# Patient Record
Sex: MALE | Race: WHITE | NOT HISPANIC OR LATINO
[De-identification: names, ages, dates, MRNs, and addresses within clinical notes are randomized per-mention and may not be internally consistent; named-entity substitution may affect disease eponyms.]

---

## 2018-07-03 ENCOUNTER — TRANSCRIPTION ENCOUNTER (OUTPATIENT)
Age: 83
End: 2018-07-03

## 2019-03-20 ENCOUNTER — NON-APPOINTMENT (OUTPATIENT)
Age: 84
End: 2019-03-20

## 2019-03-20 ENCOUNTER — APPOINTMENT (OUTPATIENT)
Dept: CARDIOLOGY | Facility: CLINIC | Age: 84
End: 2019-03-20
Payer: MEDICARE

## 2019-03-20 VITALS
WEIGHT: 198 LBS | HEIGHT: 70 IN | BODY MASS INDEX: 28.35 KG/M2 | OXYGEN SATURATION: 99 % | SYSTOLIC BLOOD PRESSURE: 181 MMHG | DIASTOLIC BLOOD PRESSURE: 98 MMHG | HEART RATE: 88 BPM

## 2019-03-20 VITALS — DIASTOLIC BLOOD PRESSURE: 88 MMHG | SYSTOLIC BLOOD PRESSURE: 140 MMHG

## 2019-03-20 DIAGNOSIS — R01.1 CARDIAC MURMUR, UNSPECIFIED: ICD-10-CM

## 2019-03-20 DIAGNOSIS — Z87.81 PERSONAL HISTORY OF (HEALED) TRAUMATIC FRACTURE: ICD-10-CM

## 2019-03-20 DIAGNOSIS — L71.9 ROSACEA, UNSPECIFIED: ICD-10-CM

## 2019-03-20 DIAGNOSIS — Z87.891 PERSONAL HISTORY OF NICOTINE DEPENDENCE: ICD-10-CM

## 2019-03-20 PROCEDURE — 99204 OFFICE O/P NEW MOD 45 MIN: CPT

## 2019-03-20 PROCEDURE — 93000 ELECTROCARDIOGRAM COMPLETE: CPT

## 2019-04-10 ENCOUNTER — APPOINTMENT (OUTPATIENT)
Dept: CARDIOLOGY | Facility: CLINIC | Age: 84
End: 2019-04-10
Payer: MEDICARE

## 2019-04-10 PROCEDURE — 93306 TTE W/DOPPLER COMPLETE: CPT

## 2019-04-12 NOTE — REASON FOR VISIT
[Initial Evaluation] : an initial evaluation of [Hypertension] : hypertension [FreeTextEntry1] : Mr. Petr Kaplan presented to the office today for initial cardiology consultation, regarding evaluation and management of hypertension. He has been following with another cardiologist, Dr. Petr Juan, however, he wanted to see me for an opinion, noting that I have been caring for his wife.\par \par The patient is an 83-year-old male who does not have a documented cardiac history. He does report having had an echocardiogram performed in the office it is primary care provider a few years ago, which revealed "some sort of abnormality". He decided to consult with Dr. Josue Merritt at that time, who referred him for a nuclear stress study for further evaluation. According to the patient, this study was "normal", and no further cardiac evaluation and/or treatment was recommended to him at that time. Approximately 2 years ago, the patient was noted to have elevated blood pressure reading at the time of a routine follow-up medical evaluation, for which his primary care provider recommended that antihypertensive therapy be initiated. The patient again decided to consult with Dr. Merritt, who he spoke with on the telephone, and who referred him to Dr. Petr Juan for further evaluation. Dr. Juan referred the patient for 24-hour ambulatory blood pressure monitoring for further evaluation, which was performed on 11/2/16, and revealed the average blood pressure reading to be 143/71 (average during waking hours 145/72, average during sleeping hours 135/65). Based on these findings, the patient states that Dr. Juan did not recommend antihypertensive therapy. The patient now presents for another opinion regarding management of his blood pressure.\par \par The patient does not describe having experienced chest discomfort or dyspnea on exertion in association with his activities. He has not noted orthopnea, paroxysmal nocturnal dyspnea, or lower extremity edema. He has not experienced any episodes of palpitations, presyncope or syncope.\par \par As far as risk factors for coronary artery disease are concerned, the patient has a history of hypertension, which has not been treated to date. He denies having a history of diabetes are dyslipidemia. He discontinued cigarette smoking at age 24, having smoked up to one pack per day for approximately 6 years prior to that time. He is not having no immediate family history of premature coronary artery disease.\par \par Past medical/surgical history according to the patient is otherwise significant for GERD, rosacea, varicose veins, a left shoulder fracture (surgical repair not required), and a laparoscopic right inguinal hernia repair (with mesh).

## 2019-04-12 NOTE — DISCUSSION/SUMMARY
[FreeTextEntry1] : Mr. Kaplan appears to have essential hypertension with a labile component, which has not been treated to date. 24 hour ambulatory blood pressure monitoring performed on 11/2/16 revealed the average blood pressure reading to be 143/71. He reports often exhibiting significantly higher blood pressure readings at visits to the physician, and more recently, upon presentation for dental implantation (the procedure was canceled in view of his elevated blood pressure reading). The patient does not describe having experienced any signs or symptoms to suggest the presence of an anginal syndrome, congestive heart failure, or a hemodynamically-compromising arrhythmia. His cardiac examination today is remarkable for a systolic ejection murmur, in all likelihood representing age-related valvular calcification. He is exhibiting bilateral lower extremity varicosities associated with mild ankle swelling bilaterally. His blood pressure reading was markedly elevated upon initial presentation to the office today, however, a follow-up measurement obtained after his examination revealed the reading to be only mildly elevated. His electrocardiogram today reveals sinus rhythm with non-specific and early R-wave transition in lead V2, non-specific diminished voltage in leads V5-V6, and a non-specific repolarization changes.\par \par I have explained to the patient that although he appears to have a labile component to his hypertension, the 24-hour ambulatory blood pressure monitoring study performed in 11/2/16 revealed his average systolic blood pressure to be in the mildly elevated range, and that antihypertensive therapy should, therefore, be considered.\par \par The patient does not wish to begin antihypertensive therapy at this point. Rather, he would like to attempt to modify his diet and lose weight, and his blood pressure remains elevated despite these measures, he would then consider antihypertensive therapy.\par \par I have reviewed a sodium-restricted diet at length with the patient today, and I advised him to adhere to this type of diet, in an effort to lower his blood pressure.\par \par I have explained to the patient that the abnormalities noted on his electrocardiogram are considered non-specific in nature. Reassuring is that he reports having had a negative nuclear stress study performed a few years ago.\par \par I am referring the patient for echocardiography to evaluate for the presence of hypertensive heart disease, as well as to evaluate the heart murmur heard on examination today. The patient is going to make arrangements to have this study performed through our office, and I will telephone him to discuss the findings, once the study has been completed.\par \par I have asked the patient to call me if he should have any questions or problems pertaining to these matters, and especially if he should experience any concerning symptoms. He will otherwise return to the office for follow-up cardiac evaluation and blood pressure reassessment in 3 months, or diet he remains clinically stable in the interim, and the findings on the upcoming echocardiogram and do not warrant sooner evaluation and/or treatment.

## 2019-04-12 NOTE — PHYSICAL EXAM
[General Appearance - In No Acute Distress] : no acute distress [Normal Conjunctiva] : the conjunctiva exhibited no abnormalities [No Oral Pallor] : no oral pallor [Not Palpable] : not palpable [No Precordial Heave] : no precordial heave was noted [Rhythm Regular] : regular [Normal Rate] : normal [Normal S1] : normal S1 [Normal S2] : normal S2 [No Gallop] : no gallop heard [II] : a grade 2 [2+] : right 2+ [No Abnormalities] : the abdominal aorta was not enlarged and no bruit was heard [___ +] : bilateral [unfilled]U+ pitting edema to the ankles [Rt] : varicose veins of the right leg noted [Lt] : varicose veins of the left leg noted [Respiration, Rhythm And Depth] : normal respiratory rhythm and effort [Auscultation Breath Sounds / Voice Sounds] : lungs were clear to auscultation bilaterally [Abdomen Tenderness] : non-tender [Bowel Sounds] : normal bowel sounds [Abnormal Walk] : normal gait [] : no rash [Cyanosis, Localized] : no localized cyanosis [Oriented To Time, Place, And Person] : oriented to person, place, and time [FreeTextEntry1] : no JVD is appreciated at a 45° angle [Apical Thrill] : no thrill palpable at the apex [Click] : no click [Pericardial Rub] : no pericardial rub [Distant] : the heart sounds were ~L not distant [Left Carotid Bruit] : no bruit heard over the left carotid [Right Carotid Bruit] : no bruit heard over the right carotid

## 2019-06-24 ENCOUNTER — APPOINTMENT (OUTPATIENT)
Dept: CARDIOLOGY | Facility: CLINIC | Age: 84
End: 2019-06-24
Payer: MEDICARE

## 2019-06-24 ENCOUNTER — NON-APPOINTMENT (OUTPATIENT)
Age: 84
End: 2019-06-24

## 2019-06-24 VITALS
DIASTOLIC BLOOD PRESSURE: 79 MMHG | WEIGHT: 192 LBS | HEART RATE: 66 BPM | SYSTOLIC BLOOD PRESSURE: 169 MMHG | BODY MASS INDEX: 27.49 KG/M2 | OXYGEN SATURATION: 97 % | HEIGHT: 70 IN

## 2019-06-24 PROCEDURE — 93000 ELECTROCARDIOGRAM COMPLETE: CPT

## 2019-06-24 PROCEDURE — 99215 OFFICE O/P EST HI 40 MIN: CPT

## 2019-06-24 NOTE — HISTORY OF PRESENT ILLNESS
[FreeTextEntry1] : Mr. Petr Kaplan presented to the office today for follow-up cardiac evaluation. I previously evaluated the patient in initial cardiology consultation on 3/20/19, regarding evaluation and management of hypertension. He had been following with another cardiologist, Dr. Petr Juan, however, he wanted to see me for an opinion, noting that I have been caring for his wife.\par \par The patient is an 84-year-old male who does not have a documented cardiac history. He does report having had an echocardiogram performed in the office of his primary care provider a few years ago, which revealed "some sort of abnormality". He decided to consult with Dr. Josue Merritt at that time, who referred him for a nuclear stress study for further evaluation. According to the patient, this study was "normal", and no further cardiac evaluation and/or treatment was recommended to him at that time. Approximately 2 years ago, the patient was noted to have elevated blood pressure reading at the time of a routine follow-up medical evaluation, for which his primary care provider recommended that antihypertensive therapy be initiated. The patient again decided to consult with Dr. Merritt, who he spoke with on the telephone, and who referred him to Dr. Petr Juan for further evaluation. Dr. Juan referred the patient for 24-hour ambulatory blood pressure monitoring for further evaluation, which was performed on 11/2/16, and revealed the average blood pressure reading to be 143/71 (average during waking hours 145/72, average during sleeping hours 135/65). Based on these findings, the patient states that Dr. Juan did not recommend antihypertensive therapy.\par \par At the time the patient's previous visit here on 3/20/19, he was exhibiting an elevated blood pressure reading. I recommended antihypertensive therapy, noting a previous ambulatory blood pressure monitoring performed with his previous cardiologist on 11/2/16 had revealed the average blood pressure reading to be elevated at 143/71. He declined being started on antihypertensive therapy at that time, however, stating that he wanted to try modifying his diet and losing weight. He presents today for blood pressure reassessment.\par \par The patient has been stable from a cardiac symptomatic standpoint since his previous visit here on 3/20/19. Specifically, he does not describe having experienced chest discomfort or dyspnea on exertion in association with his activities. He has not noted orthopnea, paroxysmal nocturnal dyspnea, or lower extremity edema. He has not experienced any episodes of palpitations, presyncope or syncope.\par \par Echocardiography performed on 4/10/19 revealed normal cardiac chamber size is with mild concentric left ventricular hypertrophy. Left ventricular systolic function was normal, with a calculated ejection fraction of 50%. Mild mitral regurgitation, mild pulmonic regurgitation, and mild to moderate tricuspid regurgitation were demonstrated, with an estimated right ventricular systolic pressure of 39 mm of mercury.\par \par As far as risk factors for coronary artery disease are concerned, the patient has a history of hypertension, which has not been treated to date. He denies having a history of diabetes are dyslipidemia. He discontinued cigarette smoking at age 24, having smoked up to one pack per day for approximately 6 years prior to that time. He is not having no immediate family history of premature coronary artery disease.\par \par Past medical/surgical history according to the patient is otherwise significant for GERD, rosacea, varicose veins, a left shoulder fracture (surgical repair not required), and a laparoscopic right inguinal hernia repair (with mesh).

## 2019-06-24 NOTE — DISCUSSION/SUMMARY
[FreeTextEntry1] : Mr. Kaplan appears to have essential hypertension with a labile component. 24 hour ambulatory blood pressure monitoring performed on 11/2/16 revealed the average blood pressure reading to be 143/71. He reports often exhibiting significantly higher blood pressure readings at visits to the physician, and more recently, upon presentation for dental implantation (the procedure was canceled in view of his elevated blood pressure reading). The patient does not describe having experienced any signs or symptoms to suggest the presence of an anginal syndrome, congestive heart failure, or a hemodynamically-compromising arrhythmia. His cardiac examination today is remarkable for a systolic ejection murmur, in all likelihood representing age-related valvular calcification, unchanged from his previous visit with me. He is again exhibiting bilateral lower extremity varicosities associated with mild ankle swelling bilaterally. His blood pressure reading is moderately elevated today. His electrocardiogram today reveals sinus rhythm with non-specific early R-wave transition in lead V2, non-specific diminished voltage in leads V5-V6, essentially unchanged from his previous office tracing.\par \par I have explained to the patient that although he appears to have a labile component to his hypertension, the 24-hour ambulatory blood pressure monitoring study performed in 11/2/16 revealed his average systolic blood pressure to be in the mildly elevated range, and that antihypertensive therapy should, therefore, be initiated. He is agreeable, and I have electronically prescribed Diovan 80 mg daily to his pharmacy for him today. I have asked him to call me if he should have any difficulty tolerating this medication.\par \par I have again reviewed a sodium-restricted diet at length with the patient today, and I have advised him to adhere to this type of diet, in an effort to lower his blood pressure.\par \par I have explained to the patient that the abnormalities noted on his electrocardiogram are considered non-specific in nature. Reassuring is that he reports having had a negative nuclear stress study performed a few years ago.\par \par I have reviewed the findings of the echocardiogram 4/10/19 in detail with the patient today, pointing out to him that the finding of mild concentric left ventricular hypertrophy is often associated with hypertension.\par \par I have asked the patient to call me if he should have any questions or problems pertaining to these matters, and especially if he should experience any concerning symptoms. I have otherwise asked him to return to the office for follow-up cardiac evaluation and blood pressure reassessment in one month, provided he remains clinically stable in the interim.

## 2019-06-24 NOTE — PHYSICAL EXAM
[General Appearance - In No Acute Distress] : no acute distress [Normal Conjunctiva] : the conjunctiva exhibited no abnormalities [No Oral Pallor] : no oral pallor [Respiration, Rhythm And Depth] : normal respiratory rhythm and effort [Bowel Sounds] : normal bowel sounds [Auscultation Breath Sounds / Voice Sounds] : lungs were clear to auscultation bilaterally [Abdomen Tenderness] : non-tender [Cyanosis, Localized] : no localized cyanosis [Abnormal Walk] : normal gait [] : no rash [Oriented To Time, Place, And Person] : oriented to person, place, and time [Not Palpable] : not palpable [No Precordial Heave] : no precordial heave was noted [Normal Rate] : normal [Rhythm Regular] : regular [Normal S1] : normal S1 [No Gallop] : no gallop heard [Normal S2] : normal S2 [II] : a grade 2 [2+] : left 2+ [No Abnormalities] : the abdominal aorta was not enlarged and no bruit was heard [___ +] : bilateral [unfilled]U+ pitting edema to the ankles [Rt] : varicose veins of the right leg noted [Lt] : varicose veins of the left leg noted [FreeTextEntry1] : no JVD is appreciated at a 45° angle [Apical Thrill] : no thrill palpable at the apex [Click] : no click [Pericardial Rub] : no pericardial rub [Right Carotid Bruit] : no bruit heard over the right carotid [Left Carotid Bruit] : no bruit heard over the left carotid

## 2019-07-24 ENCOUNTER — NON-APPOINTMENT (OUTPATIENT)
Age: 84
End: 2019-07-24

## 2019-07-24 ENCOUNTER — APPOINTMENT (OUTPATIENT)
Dept: CARDIOLOGY | Facility: CLINIC | Age: 84
End: 2019-07-24
Payer: MEDICARE

## 2019-07-24 VITALS — SYSTOLIC BLOOD PRESSURE: 150 MMHG | DIASTOLIC BLOOD PRESSURE: 80 MMHG

## 2019-07-24 VITALS
OXYGEN SATURATION: 99 % | SYSTOLIC BLOOD PRESSURE: 164 MMHG | HEIGHT: 70 IN | WEIGHT: 193 LBS | HEART RATE: 67 BPM | BODY MASS INDEX: 27.63 KG/M2 | DIASTOLIC BLOOD PRESSURE: 82 MMHG

## 2019-07-24 DIAGNOSIS — R03.0 ELEVATED BLOOD-PRESSURE READING, W/OUT DIAGNOSIS OF HYPERTENSION: ICD-10-CM

## 2019-07-24 PROCEDURE — 93000 ELECTROCARDIOGRAM COMPLETE: CPT

## 2019-07-24 PROCEDURE — 99215 OFFICE O/P EST HI 40 MIN: CPT

## 2019-08-01 ENCOUNTER — APPOINTMENT (OUTPATIENT)
Dept: CARDIOLOGY | Facility: CLINIC | Age: 84
End: 2019-08-01
Payer: MEDICARE

## 2019-08-01 PROCEDURE — 93790 AMBL BP MNTR W/SW I&R: CPT

## 2019-08-26 NOTE — PHYSICAL EXAM
[General Appearance - In No Acute Distress] : no acute distress [Normal Conjunctiva] : the conjunctiva exhibited no abnormalities [No Oral Pallor] : no oral pallor [Auscultation Breath Sounds / Voice Sounds] : lungs were clear to auscultation bilaterally [Respiration, Rhythm And Depth] : normal respiratory rhythm and effort [Abnormal Walk] : normal gait [Abdomen Tenderness] : non-tender [Bowel Sounds] : normal bowel sounds [] : no rash [Cyanosis, Localized] : no localized cyanosis [Oriented To Time, Place, And Person] : oriented to person, place, and time [Not Palpable] : not palpable [No Precordial Heave] : no precordial heave was noted [Normal Rate] : normal [Rhythm Regular] : regular [Normal S1] : normal S1 [Normal S2] : normal S2 [No Gallop] : no gallop heard [II] : a grade 2 [No Abnormalities] : the abdominal aorta was not enlarged and no bruit was heard [2+] : left 2+ [No Pitting Edema] : no pitting edema present [Rt] : varicose veins of the right leg noted [Lt] : varicose veins of the left leg noted [FreeTextEntry1] : no JVD is appreciated at a 45° angle [Apical Thrill] : no thrill palpable at the apex [Click] : no click [Pericardial Rub] : no pericardial rub [Left Carotid Bruit] : no bruit heard over the left carotid [Right Carotid Bruit] : no bruit heard over the right carotid

## 2019-08-26 NOTE — HISTORY OF PRESENT ILLNESS
[FreeTextEntry1] : Mr. Petr Kaplan presented to the office today for follow-up cardiac evaluation. I evaluated the patient in initial cardiology consultation on 3/20/19, regarding evaluation and management of hypertension. He had been following with another cardiologist, Dr. Petr Juan, however, he wanted to see me for an opinion, noting that I have been caring for his wife. I last evaluated the patient in the office on 6/24/19.\par \par The patient is an 84-year-old male who does not have a documented cardiac history. He does report having had an echocardiogram performed in the office of his primary care provider a few years ago, which revealed "some sort of abnormality". He decided to consult with Dr. Josue Merritt at that time, who referred him for a nuclear stress study for further evaluation. According to the patient, this study was "normal", and no further cardiac evaluation and/or treatment was recommended to him at that time. Approximately 2 years ago, the patient was noted to have elevated blood pressure reading at the time of a routine follow-up medical evaluation, for which his primary care provider recommended that antihypertensive therapy be initiated. The patient again decided to consult with Dr. Merritt, who he spoke with on the telephone, and who referred him to Dr. Petr Juan for further evaluation. Dr. Juan referred the patient for 24-hour ambulatory blood pressure monitoring for further evaluation, which was performed on 11/2/16, and revealed the average blood pressure reading to be 143/71 (average during waking hours 145/72, average during sleeping hours 135/65). Based on these findings, the patient states that Dr. Juan did not recommend antihypertensive therapy.\par \par At the time of a previous visit here on 3/20/19, the patient was exhibiting an elevated blood pressure reading. I recommended antihypertensive therapy, noting that previous ambulatory blood pressure monitoring performed with his previous cardiologist on 11/2/16 had revealed the average blood pressure reading to be elevated at 143/71. He declined being started on antihypertensive therapy at that time, however, stating that he wanted to try modifying his diet and losing weight. At the time of a follow-up visit on 6/24/19, he was again exhibiting an elevated blood pressure reading, at which point he was agreeable to being started on antihypertensive therapy. Diovan 80 mg daily was initiated at that time. He presents today for blood pressure reassessment.\par \par The patient has been stable from a cardiac symptomatic standpoint since his previous visit here on 6/24/19. Specifically, he does not describe having experienced chest discomfort or dyspnea on exertion in association with his activities. He has not noted orthopnea or paroxysmal nocturnal dyspnea. He has noted resolution of previously reported lower extremity swelling. He has not experienced any episodes of palpitations, presyncope or syncope.\par \par Echocardiography performed on 4/10/19 revealed normal cardiac chamber sizes with mild concentric left ventricular hypertrophy. Left ventricular systolic function was normal, with a calculated ejection fraction of 50%. Mild mitral regurgitation, mild pulmonic regurgitation, and mild to moderate tricuspid regurgitation were demonstrated, with an estimated right ventricular systolic pressure of 39 mm of mercury.\par \par As far as risk factors for coronary artery disease are concerned, the patient has a history of hypertension. He denies having a history of diabetes or a dyslipidemia. He discontinued cigarette smoking at age 24, having smoked up to one pack per day for approximately 6 years prior to that time. He is not having no immediate family history of premature coronary artery disease.\par \par Past medical/surgical history according to the patient is otherwise significant for GERD, rosacea, varicose veins, a left shoulder fracture (surgical repair not required), and a laparoscopic right inguinal hernia repair (with mesh).

## 2019-08-26 NOTE — DISCUSSION/SUMMARY
[FreeTextEntry1] : Mr. Kaplan appears to have essential hypertension with a labile component. 24 hour ambulatory blood pressure monitoring performed on 11/2/16 revealed the average blood pressure reading to be 143/71. He reports often exhibiting significantly higher blood pressure readings at visits to the physician, and more recently, upon presentation for dental implantation (the procedure was canceled in view of his elevated blood pressure reading). The patient does not describe having experienced any signs or symptoms to suggest the presence of an anginal syndrome, congestive heart failure, or a hemodynamically-compromising arrhythmia. His cardiac examination today is remarkable for a systolic ejection murmur, in all likelihood representing age-related valvular calcification, unchanged from his previous visit with me. He is again exhibiting bilateral lower extremity varicosities, however, he is not exhibiting significant lower extremity swelling today. His blood pressure reading is moderately elevated today. His electrocardiogram today reveals sinus rhythm with non-specific early R-wave transition in leads V1-V2 and non-specific diminished voltage in lead V6, essentially unchanged from his previous office tracing.\par \par Although the patient is exhibiting a moderately elevated blood pressure reading today, he states that his blood pressure reading at the time of a consultation with a pulmonologist revealed a systolic reading to be 130. Therefore, I have instructed him to continue Diovan a dosage of 80 mg daily for the time being, and I am referring him for 24-hour ambulatory blood pressure monitoring to assess blood pressure control. He is going to make arrangements to have this study performed through our office, and I will talk with him to discuss the findings, once the study has been completed. If this study should reveal that the blood pressure is not optimally controlled, the dosage of Diovan will be increased at that point.\par \par I have again reviewed a sodium-restricted diet at length with the patient today, and I have advised him to adhere to this type of diet, in an effort to lower his blood pressure.\par \par I have explained to the patient that the abnormalities noted on his electrocardiogram are considered non-specific in nature. Reassuring is that he reports having had a negative nuclear stress study performed a few years ago.\par \par I have reviewed the findings of the echocardiogram of 4/10/19 in detail with the patient today, pointing out to him that the finding of mild concentric left ventricular hypertrophy is often associated with hypertension.\par \par I have asked the patient to call me if he should have any questions or problems pertaining to these matters, and especially if he should experience any concerning symptoms. Further recommendations regarding cardiac evaluation and/or treatment will be considered, pending the patient's clinical course, as well as the findings on the upcoming ambulatory blood pressure monitoring study.

## 2019-09-14 LAB
ALBUMIN SERPL ELPH-MCNC: 4.3 G/DL
ALP BLD-CCNC: 55 U/L
ALT SERPL-CCNC: 14 U/L
ANION GAP SERPL CALC-SCNC: 12 MMOL/L
AST SERPL-CCNC: 15 U/L
BILIRUB SERPL-MCNC: 0.8 MG/DL
BUN SERPL-MCNC: 18 MG/DL
CALCIUM SERPL-MCNC: 9.4 MG/DL
CHLORIDE SERPL-SCNC: 104 MMOL/L
CHOLEST SERPL-MCNC: 161 MG/DL
CHOLEST/HDLC SERPL: 3.8 RATIO
CO2 SERPL-SCNC: 26 MMOL/L
CREAT SERPL-MCNC: 0.99 MG/DL
GLUCOSE SERPL-MCNC: 87 MG/DL
HDLC SERPL-MCNC: 42 MG/DL
LDLC SERPL CALC-MCNC: 101 MG/DL
POTASSIUM SERPL-SCNC: 4.2 MMOL/L
PROT SERPL-MCNC: 7 G/DL
SODIUM SERPL-SCNC: 142 MMOL/L
TRIGL SERPL-MCNC: 92 MG/DL

## 2019-09-25 ENCOUNTER — APPOINTMENT (OUTPATIENT)
Dept: CARDIOLOGY | Facility: CLINIC | Age: 84
End: 2019-09-25

## 2019-10-14 ENCOUNTER — RX RENEWAL (OUTPATIENT)
Age: 84
End: 2019-10-14

## 2020-02-05 ENCOUNTER — NON-APPOINTMENT (OUTPATIENT)
Age: 85
End: 2020-02-05

## 2020-02-05 ENCOUNTER — APPOINTMENT (OUTPATIENT)
Dept: CARDIOLOGY | Facility: CLINIC | Age: 85
End: 2020-02-05
Payer: MEDICARE

## 2020-02-05 VITALS — SYSTOLIC BLOOD PRESSURE: 138 MMHG | DIASTOLIC BLOOD PRESSURE: 78 MMHG

## 2020-02-05 VITALS
SYSTOLIC BLOOD PRESSURE: 167 MMHG | HEIGHT: 70 IN | HEART RATE: 58 BPM | WEIGHT: 204 LBS | OXYGEN SATURATION: 97 % | BODY MASS INDEX: 29.2 KG/M2 | DIASTOLIC BLOOD PRESSURE: 78 MMHG

## 2020-02-05 DIAGNOSIS — I07.1 RHEUMATIC TRICUSPID INSUFFICIENCY: ICD-10-CM

## 2020-02-05 DIAGNOSIS — M79.89 OTHER SPECIFIED SOFT TISSUE DISORDERS: ICD-10-CM

## 2020-02-05 DIAGNOSIS — R94.31 ABNORMAL ELECTROCARDIOGRAM [ECG] [EKG]: ICD-10-CM

## 2020-02-05 PROCEDURE — 93000 ELECTROCARDIOGRAM COMPLETE: CPT

## 2020-02-05 PROCEDURE — 99215 OFFICE O/P EST HI 40 MIN: CPT

## 2020-08-17 ENCOUNTER — NON-APPOINTMENT (OUTPATIENT)
Age: 85
End: 2020-08-17

## 2020-08-17 ENCOUNTER — APPOINTMENT (OUTPATIENT)
Dept: CARDIOLOGY | Facility: CLINIC | Age: 85
End: 2020-08-17
Payer: MEDICARE

## 2020-08-17 VITALS
SYSTOLIC BLOOD PRESSURE: 167 MMHG | HEIGHT: 70 IN | HEART RATE: 67 BPM | WEIGHT: 195 LBS | BODY MASS INDEX: 27.92 KG/M2 | DIASTOLIC BLOOD PRESSURE: 91 MMHG | OXYGEN SATURATION: 96 %

## 2020-08-17 VITALS — DIASTOLIC BLOOD PRESSURE: 70 MMHG | SYSTOLIC BLOOD PRESSURE: 128 MMHG

## 2020-08-17 DIAGNOSIS — I34.0 NONRHEUMATIC MITRAL (VALVE) INSUFFICIENCY: ICD-10-CM

## 2020-08-17 DIAGNOSIS — I10 ESSENTIAL (PRIMARY) HYPERTENSION: ICD-10-CM

## 2020-08-17 PROCEDURE — 99215 OFFICE O/P EST HI 40 MIN: CPT

## 2020-08-17 PROCEDURE — 93000 ELECTROCARDIOGRAM COMPLETE: CPT

## 2020-08-24 ENCOUNTER — RX RENEWAL (OUTPATIENT)
Age: 85
End: 2020-08-24

## 2020-10-27 ENCOUNTER — APPOINTMENT (OUTPATIENT)
Dept: CARDIOLOGY | Facility: CLINIC | Age: 85
End: 2020-10-27
Payer: MEDICARE

## 2020-10-27 PROCEDURE — 93306 TTE W/DOPPLER COMPLETE: CPT

## 2021-10-04 ENCOUNTER — RX RENEWAL (OUTPATIENT)
Age: 86
End: 2021-10-04

## 2021-10-06 PROBLEM — I10 ESSENTIAL HYPERTENSION: Status: ACTIVE | Noted: 2019-03-20

## 2022-04-05 ENCOUNTER — TRANSCRIPTION ENCOUNTER (OUTPATIENT)
Age: 87
End: 2022-04-05

## 2022-10-03 ENCOUNTER — RX RENEWAL (OUTPATIENT)
Age: 87
End: 2022-10-03

## 2022-11-22 ENCOUNTER — NON-APPOINTMENT (OUTPATIENT)
Age: 87
End: 2022-11-22

## 2022-12-27 ENCOUNTER — EMERGENCY (EMERGENCY)
Facility: HOSPITAL | Age: 87
LOS: 1 days | Discharge: HOME | End: 2022-12-27
Attending: EMERGENCY MEDICINE | Admitting: EMERGENCY MEDICINE
Payer: MEDICARE

## 2022-12-27 VITALS
OXYGEN SATURATION: 99 % | WEIGHT: 169.98 LBS | HEIGHT: 68 IN | TEMPERATURE: 98 F | DIASTOLIC BLOOD PRESSURE: 81 MMHG | SYSTOLIC BLOOD PRESSURE: 133 MMHG | HEART RATE: 96 BPM | RESPIRATION RATE: 18 BRPM

## 2022-12-27 LAB
ALBUMIN SERPL ELPH-MCNC: 2.4 G/DL — LOW (ref 3.3–5)
ALP SERPL-CCNC: 165 U/L — HIGH (ref 40–120)
ALT FLD-CCNC: 28 U/L — SIGNIFICANT CHANGE UP (ref 12–78)
ANION GAP SERPL CALC-SCNC: 11 MMOL/L — SIGNIFICANT CHANGE UP (ref 5–17)
APTT BLD: 28.8 SEC — SIGNIFICANT CHANGE UP (ref 27.5–35.5)
AST SERPL-CCNC: 41 U/L — HIGH (ref 15–37)
BASOPHILS # BLD AUTO: 0.03 K/UL — SIGNIFICANT CHANGE UP (ref 0–0.2)
BASOPHILS NFR BLD AUTO: 0.3 % — SIGNIFICANT CHANGE UP (ref 0–2)
BILIRUB SERPL-MCNC: 1.2 MG/DL — SIGNIFICANT CHANGE UP (ref 0.2–1.2)
BUN SERPL-MCNC: 39 MG/DL — HIGH (ref 7–23)
CALCIUM SERPL-MCNC: 8.9 MG/DL — SIGNIFICANT CHANGE UP (ref 8.5–10.1)
CHLORIDE SERPL-SCNC: 106 MMOL/L — SIGNIFICANT CHANGE UP (ref 96–108)
CO2 SERPL-SCNC: 24 MMOL/L — SIGNIFICANT CHANGE UP (ref 22–31)
CREAT SERPL-MCNC: 2 MG/DL — HIGH (ref 0.5–1.3)
EGFR: 32 ML/MIN/1.73M2 — LOW
EOSINOPHIL # BLD AUTO: 0.24 K/UL — SIGNIFICANT CHANGE UP (ref 0–0.5)
EOSINOPHIL NFR BLD AUTO: 2.4 % — SIGNIFICANT CHANGE UP (ref 0–6)
FLUAV AG NPH QL: SIGNIFICANT CHANGE UP
FLUBV AG NPH QL: SIGNIFICANT CHANGE UP
GLUCOSE SERPL-MCNC: 78 MG/DL — SIGNIFICANT CHANGE UP (ref 70–99)
HCT VFR BLD CALC: 33.8 % — LOW (ref 39–50)
HGB BLD-MCNC: 11.8 G/DL — LOW (ref 13–17)
IMM GRANULOCYTES NFR BLD AUTO: 0.5 % — SIGNIFICANT CHANGE UP (ref 0–0.9)
INR BLD: 1.14 RATIO — SIGNIFICANT CHANGE UP (ref 0.88–1.16)
LYMPHOCYTES # BLD AUTO: 2.29 K/UL — SIGNIFICANT CHANGE UP (ref 1–3.3)
LYMPHOCYTES # BLD AUTO: 23.1 % — SIGNIFICANT CHANGE UP (ref 13–44)
MCHC RBC-ENTMCNC: 29 PG — SIGNIFICANT CHANGE UP (ref 27–34)
MCHC RBC-ENTMCNC: 34.9 GM/DL — SIGNIFICANT CHANGE UP (ref 32–36)
MCV RBC AUTO: 83 FL — SIGNIFICANT CHANGE UP (ref 80–100)
MONOCYTES # BLD AUTO: 0.96 K/UL — HIGH (ref 0–0.9)
MONOCYTES NFR BLD AUTO: 9.7 % — SIGNIFICANT CHANGE UP (ref 2–14)
NEUTROPHILS # BLD AUTO: 6.33 K/UL — SIGNIFICANT CHANGE UP (ref 1.8–7.4)
NEUTROPHILS NFR BLD AUTO: 64 % — SIGNIFICANT CHANGE UP (ref 43–77)
NRBC # BLD: 0 /100 WBCS — SIGNIFICANT CHANGE UP (ref 0–0)
NT-PROBNP SERPL-SCNC: 1360 PG/ML — HIGH (ref 0–450)
PLATELET # BLD AUTO: 284 K/UL — SIGNIFICANT CHANGE UP (ref 150–400)
POTASSIUM SERPL-MCNC: 4 MMOL/L — SIGNIFICANT CHANGE UP (ref 3.5–5.3)
POTASSIUM SERPL-SCNC: 4 MMOL/L — SIGNIFICANT CHANGE UP (ref 3.5–5.3)
PROT SERPL-MCNC: 6.6 G/DL — SIGNIFICANT CHANGE UP (ref 6–8.3)
PROTHROM AB SERPL-ACNC: 13.3 SEC — SIGNIFICANT CHANGE UP (ref 10.5–13.4)
RBC # BLD: 4.07 M/UL — LOW (ref 4.2–5.8)
RBC # FLD: 16.5 % — HIGH (ref 10.3–14.5)
RSV RNA NPH QL NAA+NON-PROBE: SIGNIFICANT CHANGE UP
SARS-COV-2 RNA SPEC QL NAA+PROBE: SIGNIFICANT CHANGE UP
SODIUM SERPL-SCNC: 141 MMOL/L — SIGNIFICANT CHANGE UP (ref 135–145)
WBC # BLD: 9.9 K/UL — SIGNIFICANT CHANGE UP (ref 3.8–10.5)
WBC # FLD AUTO: 9.9 K/UL — SIGNIFICANT CHANGE UP (ref 3.8–10.5)

## 2022-12-27 PROCEDURE — 70450 CT HEAD/BRAIN W/O DYE: CPT | Mod: 26,MA

## 2022-12-27 PROCEDURE — 99284 EMERGENCY DEPT VISIT MOD MDM: CPT | Mod: FS

## 2022-12-27 PROCEDURE — 72125 CT NECK SPINE W/O DYE: CPT | Mod: 26,MA

## 2022-12-27 PROCEDURE — 71045 X-RAY EXAM CHEST 1 VIEW: CPT | Mod: 26

## 2022-12-27 NOTE — ED PROVIDER NOTE - OBJECTIVE STATEMENT
86 y/o male with PMHx HTN presents today due to multiple falls.  Patient reports that he fell 2 times in the past 3 days.  Patient reports he has been having bilateral lower extremity swelling x weeks.  Patient reports the only medication he was taking was valsartan in which he stopped himself.  Patient reports he has been falling due to loss of strength as he recently lost 25 pounds going from 195 pounds to 170 pounds.  Patient has an appointment tomorrow with cardiology but reports she feels too weak to make it.  Patient sustained left-sided facial abrasions from the fall.  Patient denies chest pain, shortness of breath, headache, vomiting, LOC, dizziness, palpitations, abdominal pain, vomiting, fever, or any other complaints.

## 2022-12-27 NOTE — ED PROVIDER NOTE - PROGRESS NOTE DETAILS
Patient's work-up unremarkable except for elevated creatinine of 2.0.  Baseline is unknown patient is due to see Dr. Gomez in the morning and is supposed to have a CAT scan as well afterwards.  Patient wishes to be discharged to follow-up with Dr. Gomez

## 2022-12-27 NOTE — ED PROVIDER NOTE - CARE PROVIDER_API CALL
Josue Wall (MD)  Cardiovascular Disease; Internal Medicine  [address] [place] [address]  Phone: [phone]  Fax: [phone]  Follow Up Time: Urgent

## 2022-12-27 NOTE — ED ADULT TRIAGE NOTE - WAS YOUR LAST COVID-19 VACCINE GREATER THAN OR EQUAL TO TWO MONTHS AGO?
Controlled Substance Refill Request for oxyCODONE IR (ROXICODONE) 10 MG tablet  Problem List Complete:  Yes  Chronic pain syndrome  Problem Detail    Noted:  1/15/2021   Priority:  Medium   Overview Signed 1/15/2021  3:16 PM by Avery Duke MD   Patient is followed by Avery Duke MD for ongoing prescription of pain medication.  All refills should only be approved by this provider, or covering partner.     Medication(s): oxycodone 10 mg every 6 hours.   Maximum quantity per month: 120  Clinic visit frequency required: Q 6  months      Controlled substance agreement:  Encounter-Level CSA - 01/02/2017:    Controlled Substance Agreement - Scan on 1/3/2017  1:03 PM: CONTROLLED SUBSTANCE AGREEMENT      Patient-Level CSA:    There are no patient-level csa.         Pain Clinic evaluation in the past: Yes       Date/Location:       DIRE Total Score(s):  DIRE SCORE 1/15/2021   DIRE Total Score 15         Last [facility] website verification:  done on 1/15/2021   [email or web address]        checked in past 3 months?  No, route to RN        
Patient requesting refill of oxyCODONE IR (ROXICODONE) 10 MG tablet .  Out of medication, needs as soon as possible.    Please call when done or if you have any questions.  OK to LM on VM.    Patient uses [facility] on [address]  
Yes

## 2022-12-27 NOTE — ED PROVIDER NOTE - CLINICAL SUMMARY MEDICAL DECISION MAKING FREE TEXT BOX
88 y/o male with PMHx HTN presents today due to multiple falls.  Patient reports that he fell 2 times in the past 3 days.  Patient reports he has been having bilateral lower extremity swelling x weeks.  Patient reports the only medication he was taking was valsartan in which he stopped himself.  Patient reports he has been falling due to loss of strength as he recently lost 25 pounds going from 195 pounds to 170 pounds.  Patient has an appointment tomorrow with cardiology but reports she feels too weak to make it.  Patient sustained left-sided facial abrasions from the fall.  Patient denies chest pain, shortness of breath, headache, vomiting, LOC, dizziness, palpitations, abdominal pain, vomiting, fever, or any other complaints.  Patient exam with minimal facial abrasion no neck pain or tenderness and bilateral lower extremity edema however exam otherwise normal.  Patient's labs unremarkable except for creatinine of 2.0 BNP is in the 1000 range however chest x-ray is negative and patient is not short of breath.  EKG with no significant abnormalities.  Patient request discharge as he has an appointment with Dr. Gomez at 8 AM and is supposed to have a CAT scan following.  Dr. Gomez will follow creatinine.  Patient instructed to return for worsening symptoms or shortness of breath

## 2022-12-27 NOTE — ED PROVIDER NOTE - NSFOLLOWUPINSTRUCTIONS_ED_ALL_ED_FT
Follow-up with Dr. Gomez at 8 AM this morning as scheduled already.  As well as for your CAT scan.  Show Dr. Gomez your test results in the morning prior to getting her CAT scan.  Return for recurrent fall chest pain or shortness of breath.

## 2022-12-27 NOTE — ED PROVIDER NOTE - PATIENT PORTAL LINK FT
You can access the FollowMyHealth Patient Portal offered by [facility] by registering at the following website: [email or web address]. By joining [facility]’s FollowMyHealth portal, you will also be able to view your health information using other applications (apps) compatible with our system.

## 2022-12-27 NOTE — ED ADULT TRIAGE NOTE - CHIEF COMPLAINT QUOTE
Patient A/Ox4 with clear speech. Accompanied by his wife. BIBA from home for multiple falls. Also has BL LE edema.

## 2022-12-27 NOTE — ED PROVIDER NOTE - PHYSICAL EXAMINATION
Constitutional: Awake, Alert, non-toxic. NAD. Well appearing, well nourished.   HEAD: Normocephalic, atraumatic.   EYES: PERRL, EOM intact, conjunctiva and sclera are clear bilaterally. No raccoon eyes.   ENT: (+) scattered facial abrasions, No higgins sign. No rhinorrhea, normal pharynx, patent, no tonsillar exudate or enlargement, mucous membranes pink/moist, no erythema, no drooling or stridor.   NECK: Supple, non-tender  BACK: No midline or paraspinal TTP of cervical/thoracic/lumbar spine, FROM. No ecchymosis or hematomas.   CARDIOVASCULAR: Normal S1, S2; regular rate and rhythm.  RESPIRATORY: Normal respiratory effort; breath sounds CTAB, no wheezes, rhonchi, or rales. Speaking in full sentences. No accessory muscle use.   ABDOMEN: Soft; non-tender, non-distended  EXTREMITIES: Full passive and active ROM in all extremities; non-tender to palpation; distal pulses palpable and symmetric, no edema, no crepitus or step off, (+) B/L LE edema.   SKIN: Warm, dry; good skin turgor, no apparent lesions or rashes, no ecchymosis, brisk capillary refill.  NEURO: A&O x3. Sensory and motor functions are grossly intact. Speech is normal. Appearance and judgement seem appropriate for gender and age. No neurological deficits. Neurovascular sensation intact motor function 5/5 of upper and lower extremities, CN II-XII grossly intact, no ataxia, absent pronator drift, intact cerebellar function. Speech clear, without articulation or word-finding difficulties. Eyes- PERRL bilaterally. EOMs in tact. No nystagmus. No facial droop.

## 2022-12-28 ENCOUNTER — APPOINTMENT (OUTPATIENT)
Dept: CARDIOLOGY | Facility: CLINIC | Age: 87
End: 2022-12-28

## 2022-12-28 VITALS
TEMPERATURE: 97 F | HEART RATE: 82 BPM | OXYGEN SATURATION: 98 % | DIASTOLIC BLOOD PRESSURE: 73 MMHG | SYSTOLIC BLOOD PRESSURE: 116 MMHG | RESPIRATION RATE: 16 BRPM

## 2022-12-28 PROCEDURE — 72125 CT NECK SPINE W/O DYE: CPT | Mod: MA

## 2022-12-28 PROCEDURE — 83880 ASSAY OF NATRIURETIC PEPTIDE: CPT

## 2022-12-28 PROCEDURE — 93010 ELECTROCARDIOGRAM REPORT: CPT

## 2022-12-28 PROCEDURE — 99285 EMERGENCY DEPT VISIT HI MDM: CPT | Mod: 25

## 2022-12-28 PROCEDURE — 80053 COMPREHEN METABOLIC PANEL: CPT

## 2022-12-28 PROCEDURE — 71045 X-RAY EXAM CHEST 1 VIEW: CPT

## 2022-12-28 PROCEDURE — 70450 CT HEAD/BRAIN W/O DYE: CPT | Mod: MA

## 2022-12-28 PROCEDURE — 85730 THROMBOPLASTIN TIME PARTIAL: CPT

## 2022-12-28 PROCEDURE — 93005 ELECTROCARDIOGRAM TRACING: CPT

## 2022-12-28 PROCEDURE — 85610 PROTHROMBIN TIME: CPT

## 2022-12-28 PROCEDURE — 36415 COLL VENOUS BLD VENIPUNCTURE: CPT

## 2022-12-28 PROCEDURE — 87637 SARSCOV2&INF A&B&RSV AMP PRB: CPT

## 2022-12-28 PROCEDURE — 85025 COMPLETE CBC W/AUTO DIFF WBC: CPT

## 2022-12-28 RX ORDER — VALSARTAN 80 MG/1
80 TABLET, COATED ORAL
Qty: 90 | Refills: 0 | Status: DISCONTINUED | COMMUNITY
Start: 2019-06-24 | End: 2022-12-28

## 2022-12-30 ENCOUNTER — APPOINTMENT (OUTPATIENT)
Dept: INTERNAL MEDICINE | Facility: CLINIC | Age: 87
End: 2022-12-30
Payer: MEDICARE

## 2022-12-30 ENCOUNTER — LABORATORY RESULT (OUTPATIENT)
Age: 87
End: 2022-12-30

## 2022-12-30 ENCOUNTER — NON-APPOINTMENT (OUTPATIENT)
Age: 87
End: 2022-12-30

## 2022-12-30 VITALS
OXYGEN SATURATION: 97 % | HEART RATE: 98 BPM | RESPIRATION RATE: 16 BRPM | TEMPERATURE: 98.4 F | HEIGHT: 70 IN | WEIGHT: 170 LBS | SYSTOLIC BLOOD PRESSURE: 132 MMHG | BODY MASS INDEX: 24.34 KG/M2 | DIASTOLIC BLOOD PRESSURE: 82 MMHG

## 2022-12-30 DIAGNOSIS — R79.89 OTHER SPECIFIED ABNORMAL FINDINGS OF BLOOD CHEMISTRY: ICD-10-CM

## 2022-12-30 DIAGNOSIS — R93.89 ABNORMAL FINDINGS ON DIAGNOSTIC IMAGING OF OTHER SPECIFIED BODY STRUCTURES: ICD-10-CM

## 2022-12-30 DIAGNOSIS — R53.1 WEAKNESS: ICD-10-CM

## 2022-12-30 DIAGNOSIS — E78.5 HYPERLIPIDEMIA, UNSPECIFIED: ICD-10-CM

## 2022-12-30 DIAGNOSIS — R43.2 PARAGEUSIA: ICD-10-CM

## 2022-12-30 DIAGNOSIS — Z00.00 ENCOUNTER FOR GENERAL ADULT MEDICAL EXAMINATION W/OUT ABNORMAL FINDINGS: ICD-10-CM

## 2022-12-30 DIAGNOSIS — E04.9 NONTOXIC GOITER, UNSPECIFIED: ICD-10-CM

## 2022-12-30 DIAGNOSIS — R63.4 ABNORMAL WEIGHT LOSS: ICD-10-CM

## 2022-12-30 DIAGNOSIS — R29.6 REPEATED FALLS: ICD-10-CM

## 2022-12-30 DIAGNOSIS — D64.9 ANEMIA, UNSPECIFIED: ICD-10-CM

## 2022-12-30 DIAGNOSIS — Z87.19 PERSONAL HISTORY OF OTHER DISEASES OF THE DIGESTIVE SYSTEM: ICD-10-CM

## 2022-12-30 DIAGNOSIS — I83.93 ASYMPTOMATIC VARICOSE VEINS OF BILATERAL LOWER EXTREMITIES: ICD-10-CM

## 2022-12-30 PROCEDURE — G0439: CPT

## 2022-12-30 PROCEDURE — G0444 DEPRESSION SCREEN ANNUAL: CPT | Mod: 59

## 2022-12-30 RX ORDER — METRONIDAZOLE 10 MG/G
GEL TOPICAL
Refills: 0 | Status: DISCONTINUED | COMMUNITY
End: 2022-12-30

## 2022-12-30 NOTE — REVIEW OF SYSTEMS
[Negative] : Psychiatric [FreeTextEntry2] : see hpi  [FreeTextEntry5] : see hpi  [FreeTextEntry9] : see hpi  [de-identified] : see hpi

## 2022-12-30 NOTE — HEALTH RISK ASSESSMENT
[Never] : Never [No] : No [0] : 1) Little interest or pleasure doing things: Not at all (0) [1] : 2) Feeling down, depressed, or hopeless for several days (1) [PHQ-2 Negative - No further assessment needed] : PHQ-2 Negative - No further assessment needed [Patient declined colonoscopy] : Patient declined colonoscopy [With Family] : lives with family [] :  [[id number]] : 1

## 2022-12-30 NOTE — HISTORY OF PRESENT ILLNESS
[Parent] : parent [Family Member] : family member [FreeTextEntry1] : establish care  [de-identified] : Patient is an 87 year old male with history of HTN (now off medication) who presents today to establish care. Patient has been dealing with several health issues for the past 3 months. He states that 3 months ago, he was in his usual state of health and had been feeling strong. He was walking out of an office and states that he "tripped" and fell on his side. He denies hitting his head at that time but did hit his ribs and stomach. He denies losing consciousness before the fall. He did not go to the ER after that fall. Ever since then, he states he has been feeling very weak and off balance. He has been falling (about five times per spouse) but has never hit his head. He though it was due to his blood pressure and he took himself off of Valsartan. About 6 weeks ago, his daughter states she was on the phone with him and it sounded like his speech was slurred. She urged him to go to the ER or a neurologist but he refused. His speech has since returned to normal. He has not had any weakness on a particular side of his body, but he just feels generally weak. He has started ambulating with a cane and is normally an independent ambulatory person. \par \par He fell again two nights ago and had increased bilateral leg swelling and he went to [place] ER. CT head was negative. CT cervical spine showed an enlarged calcification in his left thyroid, as well as apical nodular lung opacities.  Labs were significant for anemia (11.8/33.8) and MASON (Cr 2.00, prior in 2019 was normal). His proBNP was also elevated. He called his cardiologist yesterday and will be having an echocardiogram and seeing cardio next week. He has been wering compressions stockings and elevating his legs. He denies any SOB or chest pain. \par \par Patient has also noticed that he has lost his taste in the past 3 months. He finds it hard to eat certain foods and per family barely drinks water. He has lost about 20 lbs unintentionally in the last 3 months. He denies any fevers, chills, night sweats, unintentional weight loss. He does occasionally notice dark stools and he does have a history of hemorrhoids. He has never had a colonoscopy or a stool heme occult test. He saw his GI last week and he was advised to get the heme occult test and also has a CT abdomen/pelvis scheduled next week.

## 2022-12-30 NOTE — PLAN
[FreeTextEntry1] : Patient presents today to establish care. His symptoms have mostly been generalized weakness, unintentional weight loss, poor appetite, and frequent falls, most recently two days ago, prompting him to go to the ER. His symptoms are concerning, especially in light of the significant weight loss and episode of slurred speech a few weeks ago leading to declining functional status. \par His labs in the ER were significant for anemia. He has never had a colonoscopy or an iFOBT. He sees Dr Lopez Dey (GI) and will be having a  CT A/P on Wednesday. I have advised to also check iFOBT in setting of weight loss and history of dark schools. Add on labs for anemia workup. \par His labs also showed an MASON. His labs two years ago showed normal kidney function. This may be due to increased PO intake, will repeat today. \par Check routine labs, along with autoimmune workup, ESR, CRP, etc \par ProBNP in the ER was elevated; this may be elevated in setting of MASON; however, he does have some trace pitting edema. He is due for followup with cardio next week and will be getting an echocardiogram done. \par Imaging showed calcification in his left thyroid; exam showed an enlarged thyroid as well. Will check US. Check thyroid studies. \par Imaging also showed apical nodularities in the lung. IN setting of weight loss and his symptoms, will check Ct chest. \par Given frequent falls and balance issues, and an episode of slurred speech per family several months ago, will rule out neurologic cause. CT head was normal in the ER two days ago. Will check MRI head. Refer to neuro. Will refer for home PT as patient has weakness and difficulty with ambulation and driving. \par Will continue to follow closely. \par

## 2022-12-30 NOTE — PHYSICAL EXAM
[No JVD] : no jugular venous distention [No Lymphadenopathy] : no lymphadenopathy [Supple] : supple [Normal Rate] : normal rate  [Regular Rhythm] : with a regular rhythm [Normal S1, S2] : normal S1 and S2 [Normal] : affect was normal and insight and judgment were intact [de-identified] : slightly enlarged thyroid  [de-identified] : +murmur  [de-identified] : +bilateral LE edema with trace pitting  [de-identified] : ambulates with cane

## 2023-01-03 ENCOUNTER — LABORATORY RESULT (OUTPATIENT)
Age: 88
End: 2023-01-03

## 2023-01-03 LAB
25(OH)D3 SERPL-MCNC: 23.8 NG/ML
ALBUMIN SERPL ELPH-MCNC: 3.2 G/DL
ALP BLD-CCNC: 184 U/L
ALT SERPL-CCNC: 24 U/L
ANA PAT FLD IF-IMP: ABNORMAL
ANA SER IF-ACNC: ABNORMAL
ANION GAP SERPL CALC-SCNC: 18 MMOL/L
AST SERPL-CCNC: 35 U/L
B BURGDOR AB SER-IMP: NEGATIVE
B BURGDOR IGG+IGM SER QL: 0.16 INDEX
BASOPHILS # BLD AUTO: 0.05 K/UL
BASOPHILS NFR BLD AUTO: 0.5 %
BILIRUB SERPL-MCNC: 1 MG/DL
BUN SERPL-MCNC: 34 MG/DL
CALCIUM SERPL-MCNC: 9.1 MG/DL
CHLORIDE SERPL-SCNC: 102 MMOL/L
CHOLEST SERPL-MCNC: 188 MG/DL
CO2 SERPL-SCNC: 19 MMOL/L
CREAT SERPL-MCNC: 1.91 MG/DL
CRP SERPL-MCNC: 64 MG/L
EGFR: 34 ML/MIN/1.73M2
EOSINOPHIL # BLD AUTO: 0.18 K/UL
EOSINOPHIL NFR BLD AUTO: 1.8 %
ERYTHROCYTE [SEDIMENTATION RATE] IN BLOOD BY WESTERGREN METHOD: 12 MM/HR
ESTIMATED AVERAGE GLUCOSE: 97 MG/DL
FERRITIN SERPL-MCNC: 686 NG/ML
FOLATE SERPL-MCNC: 13.4 NG/ML
GGT SERPL-CCNC: 162 U/L
GLUCOSE SERPL-MCNC: 101 MG/DL
HBA1C MFR BLD HPLC: 5 %
HCT VFR BLD CALC: 38.1 %
HDLC SERPL-MCNC: 32 MG/DL
HGB BLD-MCNC: 12.9 G/DL
IMM GRANULOCYTES NFR BLD AUTO: 0.3 %
IRON SATN MFR SERPL: 24 %
IRON SERPL-MCNC: 33 UG/DL
LDLC SERPL CALC-MCNC: 128 MG/DL
LYMPHOCYTES # BLD AUTO: 1.63 K/UL
LYMPHOCYTES NFR BLD AUTO: 16.6 %
M TB IFN-G BLD-IMP: NEGATIVE
MAN DIFF?: NORMAL
MCHC RBC-ENTMCNC: 29.4 PG
MCHC RBC-ENTMCNC: 33.9 GM/DL
MCV RBC AUTO: 86.8 FL
MONOCYTES # BLD AUTO: 0.78 K/UL
MONOCYTES NFR BLD AUTO: 8 %
NEUTROPHILS # BLD AUTO: 7.13 K/UL
NEUTROPHILS NFR BLD AUTO: 72.8 %
NONHDLC SERPL-MCNC: 156 MG/DL
PLATELET # BLD AUTO: 349 K/UL
POTASSIUM SERPL-SCNC: 4 MMOL/L
PROT SERPL-MCNC: 6.7 G/DL
PSA SERPL-MCNC: 0.62 NG/ML
QUANTIFERON TB PLUS MITOGEN MINUS NIL: 0.64 IU/ML
QUANTIFERON TB PLUS NIL: 0.07 IU/ML
QUANTIFERON TB PLUS TB1 MINUS NIL: 0.01 IU/ML
QUANTIFERON TB PLUS TB2 MINUS NIL: 0.02 IU/ML
RBC # BLD: 4.39 M/UL
RBC # FLD: 17.4 %
RHEUMATOID FACT SER QL: <10 IU/ML
SODIUM SERPL-SCNC: 139 MMOL/L
TIBC SERPL-MCNC: 136 UG/DL
TRIGL SERPL-MCNC: 140 MG/DL
TSH SERPL-ACNC: 2.9 UIU/ML
UIBC SERPL-MCNC: 103 UG/DL
VIT B12 SERPL-MCNC: 1605 PG/ML
WBC # FLD AUTO: 9.8 K/UL

## 2023-01-04 LAB
DEPRECATED KAPPA LC FREE/LAMBDA SER: 0.82 RATIO
KAPPA LC CSF-MCNC: 8.31 MG/DL
KAPPA LC SERPL-MCNC: 6.8 MG/DL

## 2023-01-06 ENCOUNTER — APPOINTMENT (OUTPATIENT)
Dept: CT IMAGING | Facility: CLINIC | Age: 88
End: 2023-01-06
Payer: MEDICARE

## 2023-01-06 LAB
ALP BLD-CCNC: 186 IU/L
ALP BONE CFR SERPL: 24 %
ALP INTEST CFR SERPL: 0 %
ALP LIVER CFR SERPL: 76 %

## 2023-01-06 PROCEDURE — 71250 CT THORAX DX C-: CPT

## 2023-01-09 LAB
ALBUMIN MFR SERPL ELPH: 46.9 %
ALBUMIN SERPL-MCNC: 3.1 G/DL
ALBUMIN/GLOB SERPL: 0.9 RATIO
ALPHA1 GLOB MFR SERPL ELPH: 7.1 %
ALPHA1 GLOB SERPL ELPH-MCNC: 0.5 G/DL
ALPHA2 GLOB MFR SERPL ELPH: 12.5 %
ALPHA2 GLOB SERPL ELPH-MCNC: 0.8 G/DL
B-GLOBULIN MFR SERPL ELPH: 8.8 %
B-GLOBULIN SERPL ELPH-MCNC: 0.6 G/DL
GAMMA GLOB FLD ELPH-MCNC: 1.7 G/DL
GAMMA GLOB MFR SERPL ELPH: 24.7 %
INTERPRETATION SERPL IEP-IMP: NORMAL
PROT SERPL-MCNC: 6.7 G/DL
PROT SERPL-MCNC: 6.7 G/DL

## 2023-01-10 LAB
APPEARANCE: ABNORMAL
BACTERIA UR CULT: NORMAL
BACTERIA: NEGATIVE
BILIRUBIN URINE: NEGATIVE
BLOOD URINE: NEGATIVE
CALCIUM OXALATE CRYSTALS: ABNORMAL
COLOR: YELLOW
GLUCOSE QUALITATIVE U: NEGATIVE
HYALINE CASTS: 0 /LPF
KETONES URINE: NEGATIVE
LEUKOCYTE ESTERASE URINE: NEGATIVE
MICROSCOPIC-UA: NORMAL
NITRITE URINE: NEGATIVE
PH URINE: 6
PROTEIN URINE: ABNORMAL
RED BLOOD CELLS URINE: 0 /HPF
SPECIFIC GRAVITY URINE: 1.02
SQUAMOUS EPITHELIAL CELLS: 0 /HPF
UROBILINOGEN URINE: NORMAL
WHITE BLOOD CELLS URINE: 1 /HPF

## 2023-01-11 ENCOUNTER — NON-APPOINTMENT (OUTPATIENT)
Age: 88
End: 2023-01-11

## 2023-01-12 ENCOUNTER — APPOINTMENT (OUTPATIENT)
Dept: MRI IMAGING | Facility: CLINIC | Age: 88
End: 2023-01-12
Payer: MEDICARE

## 2023-01-12 PROCEDURE — 70551 MRI BRAIN STEM W/O DYE: CPT

## 2023-01-13 ENCOUNTER — NON-APPOINTMENT (OUTPATIENT)
Age: 88
End: 2023-01-13

## 2023-01-13 ENCOUNTER — INPATIENT (INPATIENT)
Facility: HOSPITAL | Age: 88
LOS: 0 days | Discharge: HOME | DRG: 65 | End: 2023-01-14
Attending: INTERNAL MEDICINE | Admitting: INTERNAL MEDICINE
Payer: COMMERCIAL

## 2023-01-13 ENCOUNTER — APPOINTMENT (OUTPATIENT)
Dept: INTERNAL MEDICINE | Facility: CLINIC | Age: 88
End: 2023-01-13

## 2023-01-13 VITALS
RESPIRATION RATE: 16 BRPM | SYSTOLIC BLOOD PRESSURE: 124 MMHG | DIASTOLIC BLOOD PRESSURE: 71 MMHG | TEMPERATURE: 97 F | WEIGHT: 169.98 LBS | HEIGHT: 68 IN | HEART RATE: 100 BPM | OXYGEN SATURATION: 100 %

## 2023-01-13 DIAGNOSIS — I63.9 CEREBRAL INFARCTION, UNSPECIFIED: ICD-10-CM

## 2023-01-13 DIAGNOSIS — Z29.9 ENCOUNTER FOR PROPHYLACTIC MEASURES, UNSPECIFIED: ICD-10-CM

## 2023-01-13 DIAGNOSIS — N17.9 ACUTE KIDNEY FAILURE, UNSPECIFIED: ICD-10-CM

## 2023-01-13 DIAGNOSIS — G45.9 TRANSIENT CEREBRAL ISCHEMIC ATTACK, UNSPECIFIED: ICD-10-CM

## 2023-01-13 DIAGNOSIS — C18.9 MALIGNANT NEOPLASM OF COLON, UNSPECIFIED: ICD-10-CM

## 2023-01-13 DIAGNOSIS — E87.6 HYPOKALEMIA: ICD-10-CM

## 2023-01-13 DIAGNOSIS — I10 ESSENTIAL (PRIMARY) HYPERTENSION: ICD-10-CM

## 2023-01-13 DIAGNOSIS — Z98.890 OTHER SPECIFIED POSTPROCEDURAL STATES: Chronic | ICD-10-CM

## 2023-01-13 DIAGNOSIS — D64.9 ANEMIA, UNSPECIFIED: ICD-10-CM

## 2023-01-13 LAB
ALBUMIN SERPL ELPH-MCNC: 2.4 G/DL — LOW (ref 3.3–5)
ALP SERPL-CCNC: 154 U/L — HIGH (ref 40–120)
ALT FLD-CCNC: 19 U/L — SIGNIFICANT CHANGE UP (ref 12–78)
ANION GAP SERPL CALC-SCNC: 11 MMOL/L — SIGNIFICANT CHANGE UP (ref 5–17)
APPEARANCE UR: CLEAR — SIGNIFICANT CHANGE UP
APTT BLD: 29.8 SEC — SIGNIFICANT CHANGE UP (ref 27.5–35.5)
AST SERPL-CCNC: 28 U/L — SIGNIFICANT CHANGE UP (ref 15–37)
BACTERIA # UR AUTO: ABNORMAL
BASOPHILS # BLD AUTO: 0.03 K/UL — SIGNIFICANT CHANGE UP (ref 0–0.2)
BASOPHILS NFR BLD AUTO: 0.4 % — SIGNIFICANT CHANGE UP (ref 0–2)
BILIRUB SERPL-MCNC: 1.2 MG/DL — SIGNIFICANT CHANGE UP (ref 0.2–1.2)
BILIRUB UR-MCNC: NEGATIVE — SIGNIFICANT CHANGE UP
BUN SERPL-MCNC: 29 MG/DL — HIGH (ref 7–23)
CALCIUM SERPL-MCNC: 8.5 MG/DL — SIGNIFICANT CHANGE UP (ref 8.5–10.1)
CHLORIDE SERPL-SCNC: 103 MMOL/L — SIGNIFICANT CHANGE UP (ref 96–108)
CO2 SERPL-SCNC: 27 MMOL/L — SIGNIFICANT CHANGE UP (ref 22–31)
COLOR SPEC: YELLOW — SIGNIFICANT CHANGE UP
CREAT SERPL-MCNC: 1.8 MG/DL — HIGH (ref 0.5–1.3)
DIFF PNL FLD: ABNORMAL
EGFR: 36 ML/MIN/1.73M2 — LOW
EOSINOPHIL # BLD AUTO: 0.32 K/UL — SIGNIFICANT CHANGE UP (ref 0–0.5)
EOSINOPHIL NFR BLD AUTO: 3.7 % — SIGNIFICANT CHANGE UP (ref 0–6)
EPI CELLS # UR: SIGNIFICANT CHANGE UP
FLUAV AG NPH QL: SIGNIFICANT CHANGE UP
FLUBV AG NPH QL: SIGNIFICANT CHANGE UP
GLUCOSE SERPL-MCNC: 92 MG/DL — SIGNIFICANT CHANGE UP (ref 70–99)
GLUCOSE UR QL: NEGATIVE — SIGNIFICANT CHANGE UP
HCT VFR BLD CALC: 33.2 % — LOW (ref 39–50)
HGB BLD-MCNC: 11.5 G/DL — LOW (ref 13–17)
IMM GRANULOCYTES NFR BLD AUTO: 0.5 % — SIGNIFICANT CHANGE UP (ref 0–0.9)
INR BLD: 1.09 RATIO — SIGNIFICANT CHANGE UP (ref 0.88–1.16)
KETONES UR-MCNC: ABNORMAL
LEUKOCYTE ESTERASE UR-ACNC: NEGATIVE — SIGNIFICANT CHANGE UP
LYMPHOCYTES # BLD AUTO: 1.19 K/UL — SIGNIFICANT CHANGE UP (ref 1–3.3)
LYMPHOCYTES # BLD AUTO: 13.9 % — SIGNIFICANT CHANGE UP (ref 13–44)
MCHC RBC-ENTMCNC: 29.4 PG — SIGNIFICANT CHANGE UP (ref 27–34)
MCHC RBC-ENTMCNC: 34.6 GM/DL — SIGNIFICANT CHANGE UP (ref 32–36)
MCV RBC AUTO: 84.9 FL — SIGNIFICANT CHANGE UP (ref 80–100)
MONOCYTES # BLD AUTO: 0.76 K/UL — SIGNIFICANT CHANGE UP (ref 0–0.9)
MONOCYTES NFR BLD AUTO: 8.9 % — SIGNIFICANT CHANGE UP (ref 2–14)
NEUTROPHILS # BLD AUTO: 6.22 K/UL — SIGNIFICANT CHANGE UP (ref 1.8–7.4)
NEUTROPHILS NFR BLD AUTO: 72.6 % — SIGNIFICANT CHANGE UP (ref 43–77)
NITRITE UR-MCNC: NEGATIVE — SIGNIFICANT CHANGE UP
NRBC # BLD: 0 /100 WBCS — SIGNIFICANT CHANGE UP (ref 0–0)
PH UR: 5 — SIGNIFICANT CHANGE UP (ref 5–8)
PLATELET # BLD AUTO: 305 K/UL — SIGNIFICANT CHANGE UP (ref 150–400)
POTASSIUM SERPL-MCNC: 3.4 MMOL/L — LOW (ref 3.5–5.3)
POTASSIUM SERPL-SCNC: 3.4 MMOL/L — LOW (ref 3.5–5.3)
PROT SERPL-MCNC: 7.1 G/DL — SIGNIFICANT CHANGE UP (ref 6–8.3)
PROT UR-MCNC: 30 MG/DL
PROTHROM AB SERPL-ACNC: 12.8 SEC — SIGNIFICANT CHANGE UP (ref 10.5–13.4)
RBC # BLD: 3.91 M/UL — LOW (ref 4.2–5.8)
RBC # FLD: 17.4 % — HIGH (ref 10.3–14.5)
RBC CASTS # UR COMP ASSIST: ABNORMAL /HPF (ref 0–4)
RSV RNA NPH QL NAA+NON-PROBE: SIGNIFICANT CHANGE UP
SARS-COV-2 RNA SPEC QL NAA+PROBE: SIGNIFICANT CHANGE UP
SODIUM SERPL-SCNC: 141 MMOL/L — SIGNIFICANT CHANGE UP (ref 135–145)
SP GR SPEC: 1.01 — SIGNIFICANT CHANGE UP (ref 1.01–1.02)
UROBILINOGEN FLD QL: 1
WBC # BLD: 8.56 K/UL — SIGNIFICANT CHANGE UP (ref 3.8–10.5)
WBC # FLD AUTO: 8.56 K/UL — SIGNIFICANT CHANGE UP (ref 3.8–10.5)
WBC UR QL: SIGNIFICANT CHANGE UP

## 2023-01-13 PROCEDURE — 99285 EMERGENCY DEPT VISIT HI MDM: CPT | Mod: FS

## 2023-01-13 PROCEDURE — 93880 EXTRACRANIAL BILAT STUDY: CPT | Mod: 26

## 2023-01-13 PROCEDURE — 99223 1ST HOSP IP/OBS HIGH 75: CPT | Mod: GC,AI

## 2023-01-13 PROCEDURE — 93010 ELECTROCARDIOGRAM REPORT: CPT

## 2023-01-13 PROCEDURE — 93306 TTE W/DOPPLER COMPLETE: CPT | Mod: 26

## 2023-01-13 PROCEDURE — 99223 1ST HOSP IP/OBS HIGH 75: CPT

## 2023-01-13 RX ORDER — ACETAMINOPHEN 500 MG
650 TABLET ORAL EVERY 6 HOURS
Refills: 0 | Status: DISCONTINUED | OUTPATIENT
Start: 2023-01-13 | End: 2023-01-14

## 2023-01-13 RX ORDER — ONDANSETRON 8 MG/1
4 TABLET, FILM COATED ORAL EVERY 8 HOURS
Refills: 0 | Status: DISCONTINUED | OUTPATIENT
Start: 2023-01-13 | End: 2023-01-14

## 2023-01-13 RX ORDER — HEPARIN SODIUM 5000 [USP'U]/ML
5000 INJECTION INTRAVENOUS; SUBCUTANEOUS EVERY 8 HOURS
Refills: 0 | Status: DISCONTINUED | OUTPATIENT
Start: 2023-01-13 | End: 2023-01-14

## 2023-01-13 RX ORDER — ASPIRIN/CALCIUM CARB/MAGNESIUM 324 MG
81 TABLET ORAL DAILY
Refills: 0 | Status: DISCONTINUED | OUTPATIENT
Start: 2023-01-13 | End: 2023-01-14

## 2023-01-13 RX ORDER — POTASSIUM CHLORIDE 20 MEQ
40 PACKET (EA) ORAL EVERY 4 HOURS
Refills: 0 | Status: COMPLETED | OUTPATIENT
Start: 2023-01-13 | End: 2023-01-13

## 2023-01-13 RX ORDER — ATORVASTATIN CALCIUM 80 MG/1
80 TABLET, FILM COATED ORAL AT BEDTIME
Refills: 0 | Status: DISCONTINUED | OUTPATIENT
Start: 2023-01-13 | End: 2023-01-14

## 2023-01-13 RX ORDER — LANOLIN ALCOHOL/MO/W.PET/CERES
3 CREAM (GRAM) TOPICAL AT BEDTIME
Refills: 0 | Status: DISCONTINUED | OUTPATIENT
Start: 2023-01-13 | End: 2023-01-14

## 2023-01-13 RX ADMIN — Medication 40 MILLIEQUIVALENT(S): at 14:23

## 2023-01-13 RX ADMIN — ATORVASTATIN CALCIUM 80 MILLIGRAM(S): 80 TABLET, FILM COATED ORAL at 23:30

## 2023-01-13 RX ADMIN — HEPARIN SODIUM 5000 UNIT(S): 5000 INJECTION INTRAVENOUS; SUBCUTANEOUS at 23:31

## 2023-01-13 NOTE — ED PROVIDER NOTE - OBJECTIVE STATEMENT
87 year-old male, history of HTN, cancer (with ?liver mets), referred by PMD Dr Avila for admission for abnormal MRI. Patient reports that he is currently asymptomatic.  Approximately 2-1/2 weeks ago had an episode of slurred speech and also reports that recently has been having multiple falls.  Went for an MRI of the head as part of general checkup and not due to a specific symptom. 87 year-old male, history of HTN, colon cancer (with ?liver mets), referred by PMD Dr Avila for admission for abnormal MRI. Patient reports that he is currently asymptomatic.  Approximately 2-1/2 weeks ago had an episode of slurred speech and also reports that recently has been having multiple falls.  Went for an MRI of the head as part of general checkup and not due to a specific symptom.

## 2023-01-13 NOTE — PHYSICAL THERAPY INITIAL EVALUATION ADULT - TRANSFER TRAINING, PT EVAL
Patient will perform sit<->stand transfer independently walker by 2 weeks to allow patient to get on/off toilet safely.

## 2023-01-13 NOTE — CONSULT NOTE ADULT - ASSESSMENT
87M with PMHx HTN, newly dx colon cancer (with possible liver mets) referred by PMD for admission due to abnormal outpatient MRI showing concern for stroke    r/o CVA  - was sent by PMD  (Dr. 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   - no O2 requirement     - SR on tele, monitor in tele x 24 hours for surveillance   - 's, would not treat to allow for permissive HTN defer to neurology.   - ASA if ok with neuro     - EKG: SR with nonspecific ST abnormality   - denies anginal complaints    - K: 3.4 please replete  - Monitor and replete Lytes. Keep K > 4 and Mg > 2  - Will continue to follow.    Pauline Dee, [facility]  Nurse Practitioner - Cardiology   Spectra #[phone]/ [phone]     87M with PMHx HTN, newly dx colon cancer (with possible liver mets) referred by PMD for admission due to abnormal outpatient MRI showing concern for stroke    r/o CVA  - was sent by PMD  (Dr. 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   - otherwise no significant signs of volume overload, no o2 requirement     - SR on tele, monitor in tele x 24 hours for surveillance   - 's, would not treat to allow for permissive HTN defer to neurology.   - ASA if ok with neuro     - EKG: SR with nonspecific ST abnormality   - denies anginal complaints    - K: 3.4 please replete  - Monitor and replete Lytes. Keep K > 4 and Mg > 2  - Will continue to follow.    Pauline Dee, [facility]  Nurse Practitioner - Cardiology   Spectra #[phone]/ [phone]

## 2023-01-13 NOTE — H&P ADULT - HISTORY OF PRESENT ILLNESS
87M with PMH HTN, colon cancer (with possible liver mets) referred by PMD Dr. Avila for admission due to abnormal outpatient MRI showing concern for stroke. Patient reports that he is asymptomatic at this time. However, patient notes that he had an episode of slurred speech and has been having multiple falls since 2-2.5 weeks ago. Patient obtained MRI head as part of the general checkup.    In ED:  Vital signs: T 97.3F, , /71, RR 16, SpO2 100% on RA  Notable labs: Hgb 11.5, K 3.4, BUN 29, Cr 1.80, Albumin 2.4,   EKG:    87M with PMH HTN, colon cancer (with possible liver mets) referred by PMD Dr. Avila for admission due to abnormal outpatient MRI showing concern for stroke. Patient reports that he is asymptomatic at this time. However, patient notes that he had an episode of slurred speech and has been having multiple falls since 2-2.5 weeks ago. Patient obtained MRI head as part of the general checkup.    In ED:  Vital signs: T 97.3F, , /71, RR 16, SpO2 100% on RA  Notable labs: Hgb 11.5, K 3.4, BUN 29, Cr 1.80, Albumin 2.4,   EKG: NSR @ 84bpm   87M with PMH HTN, colon cancer (diagnosed 1 week ago, with possible liver mets) referred by PMD Dr. Avila for admission due to abnormal outpatient MRI showing concern for stroke. Patient reports that he is asymptomatic at this time. Of note, patient states that he has recent frequent falls (5x), most recently 3 weeks ago, but has not seen anyone for it; patient denies loss of consciousness but does confirm headstrikes. 3 months ago, patient reports slurred speech, confusion, and fall; he was not evaluated after. Patient also endorses recent lack of taste and weight loss of 25 lbs.    In ED:  Vital signs: T 97.3F, , /71, RR 16, SpO2 100% on RA  Notable labs: Hgb 11.5, K 3.4, BUN 29, Cr 1.80, Albumin 2.4,   EKG: NSR @ 84bpm  Given: KCl 40mg x1   87M with PMH HTN, colon cancer (diagnosed 1 week ago, with possible liver mets) referred by PMD Dr. Avila for admission due to abnormal outpatient MRI showing concern for stroke. Wife and daughter present at bedside and provided information during interview. Patient reports that he is asymptomatic at this time. Of note, patient states that he has recent frequent falls (5x), most recently 3 weeks ago, and was seen in [facility] ED and CTH was negative for ICH at the time. Patient denies loss of consciousness but does confirm headstrikes and states the falls have been due to weakness. 3 months ago, patient's family report he had slurred speech, confusion, and fall; he was not evaluated after that incident. Patient also endorses recent lack of taste and weight loss of 25 lbs.    In ED:  Vital signs: T 97.3F, , /71, RR 16, SpO2 100% on RA  Notable labs: Hgb 11.5, K 3.4, BUN 29, Cr 1.80, Albumin 2.4,   EKG: NSR @ 84bpm  Given: KCl 40mg x1

## 2023-01-13 NOTE — H&P ADULT - NSHPPHYSICALEXAM_GEN_ALL_CORE
T(C): 36.3 (01-13-23 @ 10:03), Max: 36.3 (01-13-23 @ 10:03)  HR: 100 (01-13-23 @ 10:03) (100 - 100)  BP: 124/71 (01-13-23 @ 10:03) (124/71 - 124/71)  RR: 16 (01-13-23 @ 10:03) (16 - 16)  SpO2: 100% (01-13-23 @ 10:03) (100% - 100%)    GENERAL: patient appears well, no acute distress, conversant  EYES: anicteric sclerae, no exudates  ENMT: oropharynx clear without erythema, no exudates, moist mucous membranes  LUNGS: clear to auscultation bilaterally, no rales or wheezing  HEART: S1/S2, regular rate and rhythm, no murmurs noted, no lower extremity edema  GASTROINTESTINAL: abdomen is soft, nontender, nondistended, normoactive bowel sounds, no palpable masses  INTEGUMENT: good skin turgor, warm skin, appears well perfused  MUSCULOSKELETAL: no clubbing or cyanosis, no obvious deformity  NEUROLOGIC: awake, alert, oriented x3, good muscle tone in 4 extremities, no obvious sensory deficits  PSYCHIATRIC: mood is good, affect is congruent, linear and logical thought process  HEME/LYMPH: no obvious ecchymosis or petechiae T(C): 36.3 (01-13-23 @ 10:03), Max: 36.3 (01-13-23 @ 10:03)  HR: 100 (01-13-23 @ 10:03) (100 - 100)  BP: 124/71 (01-13-23 @ 10:03) (124/71 - 124/71)  RR: 16 (01-13-23 @ 10:03) (16 - 16)  SpO2: 100% (01-13-23 @ 10:03) (100% - 100%)    GENERAL: patient appears well, no acute distress, conversant  EYES: anicteric sclerae, no exudates  ENMT: oropharynx clear without erythema, no exudates, moist mucous membranes  LUNGS: clear to auscultation bilaterally, no rales or wheezing  HEART: S1/S2, regular rate and rhythm, systolic murmur 2/6, no lower extremity edema  GASTROINTESTINAL: abdomen is soft, nontender, nondistended, normoactive bowel sounds, no palpable masses  INTEGUMENT: good skin turgor, warm skin, appears well perfused  MUSCULOSKELETAL: no clubbing or cyanosis, no obvious deformity  NEUROLOGIC: awake, alert, oriented x3, good muscle tone in 4 extremities, no obvious sensory deficits  PSYCHIATRIC: mood is good, affect is congruent, linear and logical thought process  HEME/LYMPH: no obvious ecchymosis or petechiae  EXT: 4+ pitting edema

## 2023-01-13 NOTE — CONSULT NOTE ADULT - SUBJECTIVE AND OBJECTIVE BOX
DOCUMENTATION IN PROGRESS    CHIEF COMPLAINT: Patient is a 87y old  Male who presents with a chief complaint of abnormal MRI, ?stroke (13 Jan 2023 13:30)      HPI:  87M with PMH HTN, colon cancer (with possible liver mets) referred by PMD Dr. Avila for admission due to abnormal outpatient MRI showing concern for stroke. Patient reports that he is asymptomatic at this time. However, patient notes that he had an episode of slurred speech and has been having multiple falls since 2-2.5 weeks ago. Patient obtained MRI head as part of the general checkup.    In ED:  Vital signs: T 97.3F, , /71, RR 16, SpO2 100% on RA  Notable labs: Hgb 11.5, K 3.4, BUN 29, Cr 1.80, Albumin 2.4,   EKG: NSR @ 84bpm   (13 Jan 2023 13:30)      EKG:    REVIEW OF SYSTEMS:   All other review of systems are negative unless indicated above    PAST MEDICAL & SURGICAL HISTORY:  HTN (hypertension)      No significant past surgical history          SOCIAL HISTORY:  No tobacco, ethanol, or drug abuse.    FAMILY HISTORY:    No family history of acute MI or sudden cardiac death.    MEDICATIONS  (STANDING):  potassium chloride    Tablet ER 40 milliEquivalent(s) Oral every 4 hours    MEDICATIONS  (PRN):      Allergies    penicillin (Hives)    Intolerances        Home meds:  Home Medications:        VITAL SIGNS:   Vital Signs Last 24 Hrs  T(C): 36.3 (13 Jan 2023 10:03), Max: 36.3 (13 Jan 2023 10:03)  T(F): 97.3 (13 Jan 2023 10:03), Max: 97.3 (13 Jan 2023 10:03)  HR: 100 (13 Jan 2023 10:03) (100 - 100)  BP: 124/71 (13 Jan 2023 10:03) (124/71 - 124/71)  BP(mean): --  RR: 16 (13 Jan 2023 10:03) (16 - 16)  SpO2: 100% (13 Jan 2023 10:03) (100% - 100%)    Parameters below as of 13 Jan 2023 10:03  Patient On (Oxygen Delivery Method): room air        I&O's Summary      On Exam:  TELE:   Constitutional: NAD, awake and alert, well-developed  HEENT: Moist Mucous Membranes, Anicteric  Pulmonary: Non-labored, breath sounds are clear bilaterally, No wheezing, rales or rhonchi  Cardiovascular: Regular, S1 and S2, No murmurs, rubs, gallops or clicks  Gastrointestinal: Bowel Sounds present, soft, nontender.   Lymph: No peripheral edema. No lymphadenopathy.  Skin: No visible rashes or ulcers.  Psych:  Mood & affect appropriate for situation    LABS: All Labs Reviewed:                        11.5   8.56  )-----------( 305      ( 13 Jan 2023 10:30 )             33.2     13 Jan 2023 10:30    141    |  103    |  29     ----------------------------<  92     3.4     |  27     |  1.80     Ca    8.5        13 Jan 2023 10:30    TPro  7.1    /  Alb  2.4    /  TBili  1.2    /  DBili  x      /  AST  28     /  ALT  19     /  AlkPhos  154    13 Jan 2023 10:30    PT/INR - ( 13 Jan 2023 10:30 )   PT: 12.8 sec;   INR: 1.09 ratio         PTT - ( 13 Jan 2023 10:30 )  PTT:29.8 sec      Blood Culture:         RADIOLOGY:              CHIEF COMPLAINT: Patient is a 87y old  Male who presents with a chief complaint of abnormal MRI, ?stroke (13 Jan 2023 13:30)    HPI:  87M with PMH HTN, colon cancer (with possible liver mets) referred by PMD Dr. Avila for admission due to abnormal outpatient MRI showing concern for stroke. Patient reports that he is asymptomatic at this time. However, patient notes that he had an episode of slurred speech and has been having multiple falls since 2-2.5 weeks ago. Patient obtained MRI head as part of the general checkup.    In Ed patient seen and examined, alert and oriented, wife at bedside. Sees Dr. Wall (outpatient cardiology) last seen in Dec 2020, with PMHx of HTN, newly diagnosed colon ca with concern for liver mets. He was sent here by PMD Dr. Avila for MRI result concern for stroke, this is a part of outpatient workup for his syncopal episode back in 12/2022.  He has an appointment for TTE at our office, concerns for LE swelling.  He denies any chest pain, sob, palpitations, dizziness. He endorsed of unintentional 25 lbs weight loss, loss of sense of taste in a three month period. He is tolerating Room Air. SR 80's on tele.        In ED:  Vital signs: T 97.3F, , /71, RR 16, SpO2 100% on RA  Notable labs: Hgb 11.5, K 3.4, BUN 29, Cr 1.80, Albumin 2.4,   EKG: NSR @ 84bpm   (13 Jan 2023 13:30)      EKG: SR with non specific ST abnormality     REVIEW OF SYSTEMS:   All other review of systems are negative unless indicated above    PAST MEDICAL & SURGICAL HISTORY:  HTN (hypertension)      No significant past surgical history          SOCIAL HISTORY:  No tobacco, ethanol, or drug abuse.    FAMILY HISTORY:    No family history of acute MI or sudden cardiac death.    MEDICATIONS  (STANDING):  potassium chloride    Tablet ER 40 milliEquivalent(s) Oral every 4 hours    MEDICATIONS  (PRN):      Allergies    penicillin (Hives)    Intolerances        Home meds:  Home Medications:        VITAL SIGNS:   Vital Signs Last 24 Hrs  T(C): 36.3 (13 Jan 2023 10:03), Max: 36.3 (13 Jan 2023 10:03)  T(F): 97.3 (13 Jan 2023 10:03), Max: 97.3 (13 Jan 2023 10:03)  HR: 100 (13 Jan 2023 10:03) (100 - 100)  BP: 124/71 (13 Jan 2023 10:03) (124/71 - 124/71)  BP(mean): --  RR: 16 (13 Jan 2023 10:03) (16 - 16)  SpO2: 100% (13 Jan 2023 10:03) (100% - 100%)    Parameters below as of 13 Jan 2023 10:03  Patient On (Oxygen Delivery Method): room air        I&O's Summary      On Exam:  TELE: SR 80's   Constitutional: NAD, awake and alert, well-developed  HEENT: Moist Mucous Membranes, Anicteric  Pulmonary: Non-labored, breath sounds are clear bilaterally, No wheezing, rales or rhonchi  Cardiovascular: Regular, S1 and S2, + murmurs, rubs, gallops or clicks  Gastrointestinal: Bowel Sounds present, soft, nontender.   Lymph: 2+ B/L LE pitting edema (chronic). No lymphadenopathy.  Skin: No visible rashes or ulcers.  Psych:  Mood & affect appropriate for situation    LABS: All Labs Reviewed:                        11.5   8.56  )-----------( 305      ( 13 Jan 2023 10:30 )             33.2     13 Jan 2023 10:30    141    |  103    |  29     ----------------------------<  92     3.4     |  27     |  1.80     Ca    8.5        13 Jan 2023 10:30    TPro  7.1    /  Alb  2.4    /  TBili  1.2    /  DBili  x      /  AST  28     /  ALT  19     /  AlkPhos  154    13 Jan 2023 10:30    PT/INR - ( 13 Jan 2023 10:30 )   PT: 12.8 sec;   INR: 1.09 ratio         PTT - ( 13 Jan 2023 10:30 )  PTT:29.8 sec      Blood Culture:         RADIOLOGY:

## 2023-01-13 NOTE — H&P ADULT - PROBLEM SELECTOR PLAN 2
Chronic, /71 on admission  - Continue to monitor hemodynamics Chronic, /71 on admission  - Patient not on home meds  - Allow for permissive HTN in setting of stroke  - Continue to monitor hemodynamics Chronic, /71 on admission  - Patient not on home meds  - Continue to monitor hemodynamics

## 2023-01-13 NOTE — H&P ADULT - NSICDXFAMILYHX_GEN_ALL_CORE_FT
FAMILY HISTORY:  Father  Still living? Unknown  FHx: prostate cancer, Age at diagnosis: Age Unknown    Mother  Still living? Unknown  FHx: Parkinson's disease, Age at diagnosis: Age Unknown

## 2023-01-13 NOTE — ED ADULT TRIAGE NOTE - CHIEF COMPLAINT QUOTE
sent by Dr. Avila for admission for positive MRI which pt stated showed "a major stroke" pt denies any symptoms at this time.  Wife reported that he did have slurred speech a few weeks ago.  Pt is unaware of when he actually had a stroke sent by Dr. Avila for admission for positive MRI which pt stated showed "a major stroke" pt denies any symptoms at this time, denies any weakness or balance issues.  Wife reported that he did have slurred speech a few weeks ago.  Pt is unaware of when he actually had a stroke

## 2023-01-13 NOTE — H&P ADULT - ATTENDING COMMENTS
87M with PMH HTN, colon cancer (with possible liver mets) referred by PMD for admission due to abnormal outpatient MRI showing concern for stroke, admitted for stroke workup.    HPI as above.     Patient seen and examined at bedside. He has no complaints today. He was sent to the hospital because his outpatient MRI showed acute CVA.   -MRI was ordered because patient was having more frequent falls and feeling off=balance. Today he has no acute complaints.     CONSTITUTIONAL: denies fever, chills, fatigue, focal weakness. admits weight loss.   HEENT: denies blurred vision, sore throat  SKIN: denies new lesions, rash  CARDIOVASCULAR: denies chest pain, chest pressure, palpitations  RESPIRATORY: denies shortness of breath, sputum production  GASTROINTESTINAL: denies nausea, vomiting, diarrhea, abdominal pain, melena, hematochezia  GENITOURINARY: denies dysuria, discharge  NEUROLOGICAL: denies numbness, headache, focal weakness  MUSCULOSKELETAL: denies new joint pain, muscle aches  HEMATOLOGIC: denies gross bleeding, bruising  LYMPHATICS: denies enlarged lymph nodes, extremity swelling    Plan:  Acute CVA: outpatient MRI reviewed. shows Acute small anterior left frontal lobe cortical infarct.  -start statin, ASA 81mg.   -check A1c, lipids,  -neuro checks as ordered. passed bedside dysphagia screen  -outside of window for permissive HTN    remainder as above.     Updated patients daughter at bedside as well as patients wife.

## 2023-01-13 NOTE — H&P ADULT - PROBLEM SELECTOR PLAN 1
Patient noted to have abnormal outpatient MRI concerning for stroke. Asymptomatic on admission but reports prior slurred speech and history of falls  - CT head:   - Aspirin 81 mg daily, Lipitor 80 mg qhs  - Lipid profile, A1C, TSH  - Neuro checks q4h  - Permissive HTN for 24 hours up to 220/110 (since no tPa given)  - Gradual normotension after 24 hours (to goal BP < 140/90 assuming no major intracranial stenosis seen on CTA head/neck)  - Telemetry monitoring to r/o arrhythmia   - Bedside dysphagia screen by RN followed by formal speech evaluation if necessary  - Repeat CT head in 24 hours (or sooner if change in neuro status)  - Fall and aspiration precautions  - F/u TTE  - F/u MRI head  - Neurology (Dr. Rodney) consulted, f/u recs  - PT evaluation  - SW consult: patient may require rehab, ?prior stroke Patient noted to have abnormal outpatient MRI concerning for stroke. Asymptomatic on admission but reports prior slurred speech and history of falls  - MRI (1/12, outpatient): Acute small anterior left frontal lobe cortical infarct  - START Aspirin, Statin, Plavix  - Lipid profile, A1C, TSH  - Neuro checks q4h  - DVT ppx: heparin  - Permissive HTN for 24 hours up to 220/110 (since no tPa given)  - Gradual normotension after 24 hours (to goal BP < 140/90 assuming no major intracranial stenosis seen on CTA head/neck)  - Telemetry monitoring to r/o arrhythmia   - Bedside dysphagia screen by RN followed by formal speech evaluation if necessary  - Repeat CT head in 24 hours (or sooner if change in neuro status)  - Fall and aspiration precautions  - F/u TTE  - Neurology (Dr. Rodney) consulted, f/u recs  - PT evaluation  - SW consult: patient may require rehab, ?prior stroke Patient noted to have abnormal outpatient MRI concerning for stroke. Asymptomatic on admission but reports prior slurred speech and history of falls  - MRI (1/12, outpatient): Acute small anterior left frontal lobe cortical infarct  - START Aspirin, Statin, Plavix  - Lipid profile, A1C, TSH  - Neuro checks q4h  - DVT ppx: heparin  - Permissive HTN for 24 hours up to 220/110 (since no tPa given)  - Gradual normotension after 24 hours (to goal BP < 140/90 assuming no major intracranial stenosis seen on CTA head/neck)  - Bedside dysphagia screen by RN followed by formal speech evaluation if necessary  - Repeat CT head in 24 hours (or sooner if change in neuro status)  - Fall and aspiration precautions  - F/u TTE  - SW consult: patient may require rehab, ?prior stroke  - Neurology (Dr. Rodney) consulted, f/u recs  - Cardiology (Dr. Fontanez) consulted, f/u recs Patient noted to have abnormal outpatient MRI concerning for stroke. Asymptomatic on admission but reports prior slurred speech and history of falls  - MRI (1/12, outpatient): Acute small anterior left frontal lobe cortical infarct  - START Aspirin, Statin, Plavix  - Lipid profile, A1C, TSH  - Neuro checks q4h  - DVT ppx: heparin SQ  - Permissive HTN for 24 hours up to 220/110 (since no tPa given)  - Gradual normotension after 24 hours (to goal BP < 140/90 assuming no major intracranial stenosis seen on CTA head/neck)  - Bedside dysphagia screen by RN  - DASH diet  - Repeat CT head in 24 hours (or sooner if change in neuro status)  - Fall and aspiration precautions  - F/u TTE  - SW consult: patient may require rehab, ?prior stroke  - PT eval: home PT  - Neurology (Dr. Rodney) consulted, f/u recs  - Cardiology (Dr. Fontanez) consulted, f/u recs Patient noted to have abnormal outpatient MRI concerning for stroke. Asymptomatic on admission but reports prior slurred speech and history of falls  - MRI (1/12, outpatient): Acute small anterior left frontal lobe cortical infarct  - START Aspirin, Statin  - Lipid profile, A1C, TSH  - Neuro checks q4h  - DVT ppx: heparin SQ  - Permissive HTN for 24 hours up to 220/110 (since no tPa given)  - Gradual normotension after 24 hours (to goal BP < 140/90 assuming no major intracranial stenosis seen on CTA head/neck)  - Bedside dysphagia screen by RN  - DASH diet  - Repeat CT head in 24 hours (or sooner if change in neuro status)  - Fall and aspiration precautions  - F/u TTE  - SW consult: patient may require rehab, ?prior stroke  - PT eval: home PT  - Neurology (Dr. Rodney) consulted, f/u recs  - Cardiology (Dr. Fontanez) consulted, f/u recs Patient noted to have abnormal outpatient MRI concerning for stroke. Asymptomatic on admission but reports prior slurred speech and history of falls [though none today]  - MRI (1/12, outpatient): Acute small anterior left frontal lobe cortical infarct  - START Aspirin, Statin  - Lipid profile, A1C, TSH  - Neuro checks q6h  - DVT ppx: heparin SQ  - outside of window for permissive HTN  - Bedside dysphagia screen by RN  - DASH diet  - Repeat CT head in 24 hours (or sooner if change in neuro status)  - Fall and aspiration precautions  - F/u TTE  - SW consult: patient may require rehab, ?prior stroke  - PT eval: home PT  - Neurology (Dr. Rodney) consulted, f/u recs  - Cardiology (Dr. Fontanez) consulted, f/u recs

## 2023-01-13 NOTE — CONSULT NOTE ADULT - NS ATTEND AMEND GEN_ALL_CORE FT
recently discovered colon ca  chronic edema, without clear recent change  some neuro sxs going back months, some more recent, mri with small acute stroke  at this point we seem to be out of the acute cva window.   bp control, asa if ok with neuro, statin, seem approp  echo, tele for 24h  has planned onc appt wed and does not seem like a course in [place] will be of benefit considering that this would prob interrupt eval and management of the malignancy  would expedite the hospital based evaluation and hopefully allow him to be dc to complete his onc workup as planned, with home based pt services, if this is appropriate from a neuro and gen med perspective

## 2023-01-13 NOTE — H&P ADULT - NSICDXPASTSURGICALHX_GEN_ALL_CORE_FT
PAST SURGICAL HISTORY:  No significant past surgical history      PAST SURGICAL HISTORY:  H/O hernia repair

## 2023-01-13 NOTE — H&P ADULT - PROBLEM SELECTOR PLAN 4
DVT ppx: SCDs pending results of stroke workup Hx of colon cancer with possible mets to liver  - Patient asymptomatic at this time  - Outpatient follow-up to monitor progression of cancer Hx of colon cancer with possible mets to liver recently diagnosed  - Patient asymptomatic at this time  -patient is currently being worked up as outpatient follows with Dr Lopez Christensen and has an appt for Dr Chung for oncology [both are [facility] doctors]. Patient was supposed to have a liver biopsy performed 1/12/23 but was rescheduled.   - Outpatient follow-up for continued managment

## 2023-01-13 NOTE — ED ADULT NURSE NOTE - OBJECTIVE STATEMENT
pt aox4, " slurred speech  2 weeks ago, MRI + stroke by PMD " no body weakness, speech clear, no facial droop at this time

## 2023-01-13 NOTE — H&P ADULT - PROBLEM SELECTOR PLAN 5
Cr 1.80 on admission  - Unknown baseline, although patient had Cr 2.0 on prior presentation in ED (12/2022)  - Creatinine Currently  - IVF ***  - Monitor BMP  - Avoid nephrotoxic medications as able  - Consider nephrology consult for worsening renal function Cr 1.80 on admission  - Unknown baseline, although patient had Cr 2.0 on prior presentation in ED (12/2022)  - IVF ***  - Monitor BMP  - Avoid nephrotoxic medications as able  - Consider nephrology consult for worsening renal function Cr 1.80 on admission  - Unknown baseline, although patient had Cr 2.0 on prior presentation in ED (12/2022)  - IVF held due to significant LE pitting edema  - Monitor BMP  - Avoid nephrotoxic medications as able  - Consider nephrology consult for worsening renal function Cr 1.80 on admission  - Unknown baseline, although patient had Cr 2.0 on prior presentation in ED (12/2022)  - IVF held due to significant LE pitting edema  - Monitor BMP  - Avoid nephrotoxic medications as able

## 2023-01-13 NOTE — ED PROVIDER NOTE - CLINICAL SUMMARY MEDICAL DECISION MAKING FREE TEXT BOX
87-year-old male history of hypertension sent in by his primary doctor Dr. Avila for an MRI showing a small acute infarct in the left frontal lobe.  Patient was having series of routine test done to evaluate for colon cancer and also had an MRI as he had experienced 2 falls in the last month and associated slurred speech which has now resolved.  Patient walks with a cane denies any acute neurologic complaints no headache no vision changes no weakness no motor or sensory deficit and normal gait with a cane.  Patient was told he needs to go immediately to the ER for major stroke on exam well-appearing denies any complaints.  Exam is within normal limits we will check labs EKG and discuss with patient's primary care regarding disposition

## 2023-01-13 NOTE — PHYSICAL THERAPY INITIAL EVALUATION ADULT - GAIT TRAINING, PT EVAL
Patient will ambulate 100 feet independently with single axis cane by 2 weeks to allow for increased independence in the community.

## 2023-01-13 NOTE — H&P ADULT - PROBLEM SELECTOR PLAN 7
DVT ppx: Heparin 5000u SQ q8h    #DISPO: Home with home PT, patient reports that he currently has home PT services coordinated but has not had initial session yet DVT ppx: Heparin 5000u SQ q8h

## 2023-01-13 NOTE — PHYSICAL THERAPY INITIAL EVALUATION ADULT - PATIENT PROFILE REVIEW, REHAB EVAL
PT orders received: no formal activity order. Consult with RN, pt may participate in PT evaluation./yes

## 2023-01-13 NOTE — ED ADULT NURSE NOTE - CHIEF COMPLAINT QUOTE
sent by Dr. Avila for admission for positive MRI which pt stated showed "a major stroke" pt denies any symptoms at this time, denies any weakness or balance issues.  Wife reported that he did have slurred speech a few weeks ago.  Pt is unaware of when he actually had a stroke

## 2023-01-13 NOTE — H&P ADULT - ASSESSMENT
87M with PMH HTN, colon cancer (with possible liver mets) referred by PMD for admission due to abnormal outpatient MRI showing concern for stroke, admitted for stroke workup.

## 2023-01-13 NOTE — H&P ADULT - PROBLEM SELECTOR PLAN 3
Hx of colon cancer with possible mets to liver  - Patient asymptomatic at this time  - Outpatient follow-up to monitor progression of cancer K 3.4 on admission  - Repleted with KCl  - Monitor renal lytes, replete PRN

## 2023-01-13 NOTE — PHYSICAL THERAPY INITIAL EVALUATION ADULT - ADDITIONAL COMMENTS
Patient lives with wife in a private house, 6 steps to navigate within the home (pt enters through side entrance without steps.) Patient reports being independent in ADLs and ambulated with a single axis cane prior to admission.

## 2023-01-13 NOTE — H&P ADULT - PROBLEM SELECTOR PLAN 6
DVT ppx: Heparin 5000u SQ q8h for DVT ppx DVT ppx: Heparin 5000u SQ q8h DVT ppx: Heparin 5000u SQ q8h    #DISPO: Home with home PT, patient reports that he currently has home PT services coordinated but has not had initial session yet H/H 11.5/33.2 on admission, prior admission in 12/2022 H/H 11.8/33.8  FU iron studies, retic count H/H 11.5/33.2 on admission, prior admission in 12/2022 H/H 11.8/33.8  FU iron studies, retic count, B12, folate

## 2023-01-13 NOTE — H&P ADULT - NSHPREVIEWOFSYSTEMS_GEN_ALL_CORE
REVIEW OF SYSTEMS:  CONSTITUTIONAL: No fever/chills or appetite changes.  HENMT: No HA, lightheadedness/dizziness  RESPIRATORY: No cough, wheezing, hemoptysis; No shortness of breath.  CARDIOVASCULAR: No chest pain, palpitations.  GASTROINTESTINAL: No abdominal or epigastric pain. No nausea or vomiting; No diarrhea or constipation.  GENITOURINARY: No dysuria, changes in frequency, hematuria, or incontinence  NEUROLOGICAL: Baseline strength. Sensation intact bilaterally.  SKIN: No itching, rashes  MUSCULOSKELETAL: No joint pain or swelling; No muscle, back, or extremity pain REVIEW OF SYSTEMS:  CONSTITUTIONAL: No fever/chills or appetite changes.  HENMT: No HA, lightheadedness/dizziness  RESPIRATORY: No cough, wheezing, hemoptysis; No shortness of breath.  CARDIOVASCULAR: No chest pain, palpitations.  GASTROINTESTINAL: No abdominal or epigastric pain. No nausea or vomiting; No diarrhea or constipation.  GENITOURINARY: No dysuria, changes in frequency, hematuria, or incontinence  NEUROLOGICAL: Baseline strength. Sensation intact bilaterally. Denies focal weakness, numbness, tingling.   SKIN: No itching, rashes  MUSCULOSKELETAL: No joint pain or swelling; No muscle, back, or extremity pain

## 2023-01-13 NOTE — H&P ADULT - NSHPSOCIALHISTORY_GEN_ALL_CORE
Tobacco: Denies  EtOH: Denies   Recreational drug use: Denies  Lives with:   Ambulates: Cane  ADLs: able to cook, clean, and shop independently Tobacco: Denies  EtOH: Denies   Recreational drug use: Denies  Lives with: Wife  Ambulates: Cane

## 2023-01-13 NOTE — PHYSICAL THERAPY INITIAL EVALUATION ADULT - PERTINENT HX OF CURRENT PROBLEM, REHAB EVAL
Patient is an 87 year old male referred by PMD Dr. Avila for admission due to abnormal outpatient MRI showing concern for stroke. Patient reports that he is asymptomatic at this time. However, patient notes that he had an episode of slurred speech and has been having multiple falls since 2-2.5 weeks ago. PMH HTN, colon cancer (with possible liver mets). CT HEAD:  No intracranial bleed, mass effect or depressed skull fracture.

## 2023-01-13 NOTE — ED ADULT NURSE NOTE - NSIMPLEMENTINTERV_GEN_ALL_ED
Implemented All Fall Risk Interventions:  [place] to call system. Call bell, personal items and telephone within reach. Instruct patient to call for assistance. Room bathroom lighting operational. Non-slip footwear when patient is off stretcher. Physically safe environment: no spills, clutter or unnecessary equipment. Stretcher in lowest position, wheels locked, appropriate side rails in place. Provide visual cue, wrist band, yellow gown, etc. Monitor gait and stability. Monitor for mental status changes and reorient to person, place, and time. Review medications for side effects contributing to fall risk. Reinforce activity limits and safety measures with patient and family.

## 2023-01-14 ENCOUNTER — APPOINTMENT (OUTPATIENT)
Dept: ULTRASOUND IMAGING | Facility: CLINIC | Age: 88
End: 2023-01-14

## 2023-01-14 ENCOUNTER — TRANSCRIPTION ENCOUNTER (OUTPATIENT)
Age: 88
End: 2023-01-14

## 2023-01-14 VITALS
OXYGEN SATURATION: 98 % | DIASTOLIC BLOOD PRESSURE: 70 MMHG | HEART RATE: 80 BPM | TEMPERATURE: 98 F | SYSTOLIC BLOOD PRESSURE: 130 MMHG | RESPIRATION RATE: 18 BRPM

## 2023-01-14 LAB
A1C WITH ESTIMATED AVERAGE GLUCOSE RESULT: 4.9 % — SIGNIFICANT CHANGE UP (ref 4–5.6)
ALBUMIN SERPL ELPH-MCNC: 2.1 G/DL — LOW (ref 3.3–5)
ALP SERPL-CCNC: 137 U/L — HIGH (ref 40–120)
ALT FLD-CCNC: 16 U/L — SIGNIFICANT CHANGE UP (ref 12–78)
ANION GAP SERPL CALC-SCNC: 11 MMOL/L — SIGNIFICANT CHANGE UP (ref 5–17)
AST SERPL-CCNC: 25 U/L — SIGNIFICANT CHANGE UP (ref 15–37)
BILIRUB SERPL-MCNC: 1.3 MG/DL — HIGH (ref 0.2–1.2)
BUN SERPL-MCNC: 23 MG/DL — SIGNIFICANT CHANGE UP (ref 7–23)
CALCIUM SERPL-MCNC: 8.1 MG/DL — LOW (ref 8.5–10.1)
CHLORIDE SERPL-SCNC: 106 MMOL/L — SIGNIFICANT CHANGE UP (ref 96–108)
CHOLEST SERPL-MCNC: 152 MG/DL — SIGNIFICANT CHANGE UP
CO2 SERPL-SCNC: 23 MMOL/L — SIGNIFICANT CHANGE UP (ref 22–31)
CREAT SERPL-MCNC: 1.3 MG/DL — SIGNIFICANT CHANGE UP (ref 0.5–1.3)
EGFR: 53 ML/MIN/1.73M2 — LOW
ESTIMATED AVERAGE GLUCOSE: 94 MG/DL — SIGNIFICANT CHANGE UP (ref 68–114)
FERRITIN SERPL-MCNC: 722 NG/ML — HIGH (ref 30–400)
FOLATE SERPL-MCNC: 10.2 NG/ML — SIGNIFICANT CHANGE UP
GLUCOSE SERPL-MCNC: 72 MG/DL — SIGNIFICANT CHANGE UP (ref 70–99)
HCT VFR BLD CALC: 31 % — LOW (ref 39–50)
HDLC SERPL-MCNC: 24 MG/DL — LOW
HGB BLD-MCNC: 10.9 G/DL — LOW (ref 13–17)
IRON SATN MFR SERPL: 27 UG/DL — LOW (ref 45–165)
IRON SATN MFR SERPL: 33 % — SIGNIFICANT CHANGE UP (ref 16–55)
LIPID PNL WITH DIRECT LDL SERPL: 104 MG/DL — HIGH
MCHC RBC-ENTMCNC: 30.1 PG — SIGNIFICANT CHANGE UP (ref 27–34)
MCHC RBC-ENTMCNC: 35.2 GM/DL — SIGNIFICANT CHANGE UP (ref 32–36)
MCV RBC AUTO: 85.6 FL — SIGNIFICANT CHANGE UP (ref 80–100)
NON HDL CHOLESTEROL: 128 MG/DL — SIGNIFICANT CHANGE UP
NRBC # BLD: 0 /100 WBCS — SIGNIFICANT CHANGE UP (ref 0–0)
PLATELET # BLD AUTO: 264 K/UL — SIGNIFICANT CHANGE UP (ref 150–400)
POTASSIUM SERPL-MCNC: 4 MMOL/L — SIGNIFICANT CHANGE UP (ref 3.5–5.3)
POTASSIUM SERPL-SCNC: 4 MMOL/L — SIGNIFICANT CHANGE UP (ref 3.5–5.3)
PROT SERPL-MCNC: 6.1 G/DL — SIGNIFICANT CHANGE UP (ref 6–8.3)
RBC # BLD: 3.62 M/UL — LOW (ref 4.2–5.8)
RBC # BLD: 3.62 M/UL — LOW (ref 4.2–5.8)
RBC # FLD: 17.2 % — HIGH (ref 10.3–14.5)
RETICS #: 47.8 K/UL — SIGNIFICANT CHANGE UP (ref 25–125)
RETICS/RBC NFR: 1.3 % — SIGNIFICANT CHANGE UP (ref 0.5–2.5)
SODIUM SERPL-SCNC: 140 MMOL/L — SIGNIFICANT CHANGE UP (ref 135–145)
TIBC SERPL-MCNC: 81 UG/DL — LOW (ref 220–430)
TRANSFERRIN SERPL-MCNC: 73 MG/DL — LOW (ref 200–360)
TRIGL SERPL-MCNC: 118 MG/DL — SIGNIFICANT CHANGE UP
TSH SERPL-MCNC: 1.93 UIU/ML — SIGNIFICANT CHANGE UP (ref 0.36–3.74)
UIBC SERPL-MCNC: 55 UG/DL — LOW (ref 110–370)
VIT B12 SERPL-MCNC: 1498 PG/ML — HIGH (ref 232–1245)
WBC # BLD: 8.45 K/UL — SIGNIFICANT CHANGE UP (ref 3.8–10.5)
WBC # FLD AUTO: 8.45 K/UL — SIGNIFICANT CHANGE UP (ref 3.8–10.5)

## 2023-01-14 PROCEDURE — 99285 EMERGENCY DEPT VISIT HI MDM: CPT

## 2023-01-14 PROCEDURE — 82746 ASSAY OF FOLIC ACID SERUM: CPT

## 2023-01-14 PROCEDURE — 80061 LIPID PANEL: CPT

## 2023-01-14 PROCEDURE — 36415 COLL VENOUS BLD VENIPUNCTURE: CPT

## 2023-01-14 PROCEDURE — 99239 HOSP IP/OBS DSCHRG MGMT >30: CPT

## 2023-01-14 PROCEDURE — 85025 COMPLETE CBC W/AUTO DIFF WBC: CPT

## 2023-01-14 PROCEDURE — 84443 ASSAY THYROID STIM HORMONE: CPT

## 2023-01-14 PROCEDURE — 83540 ASSAY OF IRON: CPT

## 2023-01-14 PROCEDURE — 81001 URINALYSIS AUTO W/SCOPE: CPT

## 2023-01-14 PROCEDURE — 83036 HEMOGLOBIN GLYCOSYLATED A1C: CPT

## 2023-01-14 PROCEDURE — 85027 COMPLETE CBC AUTOMATED: CPT

## 2023-01-14 PROCEDURE — 93306 TTE W/DOPPLER COMPLETE: CPT

## 2023-01-14 PROCEDURE — 85610 PROTHROMBIN TIME: CPT

## 2023-01-14 PROCEDURE — 80053 COMPREHEN METABOLIC PANEL: CPT

## 2023-01-14 PROCEDURE — 82728 ASSAY OF FERRITIN: CPT

## 2023-01-14 PROCEDURE — 87637 SARSCOV2&INF A&B&RSV AMP PRB: CPT

## 2023-01-14 PROCEDURE — 83550 IRON BINDING TEST: CPT

## 2023-01-14 PROCEDURE — 99232 SBSQ HOSP IP/OBS MODERATE 35: CPT

## 2023-01-14 PROCEDURE — 97162 PT EVAL MOD COMPLEX 30 MIN: CPT

## 2023-01-14 PROCEDURE — 93005 ELECTROCARDIOGRAM TRACING: CPT

## 2023-01-14 PROCEDURE — 85045 AUTOMATED RETICULOCYTE COUNT: CPT

## 2023-01-14 PROCEDURE — 84466 ASSAY OF TRANSFERRIN: CPT

## 2023-01-14 PROCEDURE — 82607 VITAMIN B-12: CPT

## 2023-01-14 PROCEDURE — 85730 THROMBOPLASTIN TIME PARTIAL: CPT

## 2023-01-14 PROCEDURE — 93880 EXTRACRANIAL BILAT STUDY: CPT

## 2023-01-14 RX ORDER — ASPIRIN/CALCIUM CARB/MAGNESIUM 324 MG
1 TABLET ORAL
Qty: 30 | Refills: 0
Start: 2023-01-14 | End: 2023-02-12

## 2023-01-14 RX ORDER — ATORVASTATIN CALCIUM 80 MG/1
1 TABLET, FILM COATED ORAL
Qty: 30 | Refills: 0
Start: 2023-01-14 | End: 2023-02-12

## 2023-01-14 RX ADMIN — Medication 81 MILLIGRAM(S): at 11:54

## 2023-01-14 RX ADMIN — HEPARIN SODIUM 5000 UNIT(S): 5000 INJECTION INTRAVENOUS; SUBCUTANEOUS at 05:51

## 2023-01-14 NOTE — PROGRESS NOTE ADULT - SUBJECTIVE AND OBJECTIVE BOX
[facility] -- Huseyin Wall, Christian Sampson Savella, Goodger: Office # [phone]    Follow Up:  CVA    Subjective/Observations: Patient seen and examined. Patient awake, alert, resting in bed. No complaints of chest pain, dyspnea, palpitations or dizziness. No signs of orthopnea or PND. Tolerating room air. Wants to go home.     REVIEW OF SYSTEMS: All other review of systems are negative unless indicated above    PAST MEDICAL & SURGICAL HISTORY:  HTN (hypertension)  H/O hernia repair    MEDICATIONS  (STANDING):  aspirin enteric coated 81 milliGRAM(s) Oral daily  atorvastatin 80 milliGRAM(s) Oral at bedtime  heparin   Injectable 5000 Unit(s) SubCutaneous every 8 hours    MEDICATIONS  (PRN):  acetaminophen     Tablet .. 650 milliGRAM(s) Oral every 6 hours PRN Temp greater or equal to 38C (100.4F), Mild Pain (1 - 3)  aluminum hydroxide/magnesium hydroxide/simethicone Suspension 30 milliLiter(s) Oral every 4 hours PRN Dyspepsia  melatonin 3 milliGRAM(s) Oral at bedtime PRN Insomnia  ondansetron Injectable 4 milliGRAM(s) IV Push every 8 hours PRN Nausea and/or Vomiting    Allergies  penicillin (Hives)    Vital Signs Last 24 Hrs  T(C): 36.6 (14 Jan 2023 08:40), Max: 36.8 (13 Jan 2023 18:00)  T(F): 97.9 (14 Jan 2023 08:40), Max: 98.3 (14 Jan 2023 05:57)  HR: 73 (14 Jan 2023 08:40) (73 - 98)  BP: 135/67 (14 Jan 2023 08:40) (120/68 - 135/67)  BP(mean): --  RR: 16 (14 Jan 2023 08:40) (16 - 18)  SpO2: 98% (14 Jan 2023 08:40) (98% - 99%)    Parameters below as of 14 Jan 2023 08:40  Patient On (Oxygen Delivery Method): room air      I&O's Summary        TELE: Not on telemetry   PHYSICAL EXAM:  Constitutional: NAD, awake and alert, Well developed   HEENT: Moist Mucous Membranes, Anicteric  Pulmonary: Non-labored, breath sounds are clear bilaterally, No wheezing, rales or rhonchi  Cardiovascular: Regular, S1 and S2, + murmurs, No rubs, gallops or clicks  Gastrointestinal:  soft, nontender, nondistended   Lymph: +2 peripheral edema. No lymphadenopathy.   Skin: No visible rashes or ulcers.  Psych:  Mood & affect appropriate      LABS: All Labs Reviewed:                        10.9   8.45  )-----------( 264      ( 14 Jan 2023 07:50 )             31.0                         11.5   8.56  )-----------( 305      ( 13 Jan 2023 10:30 )             33.2     14 Jan 2023 07:50    140    |  106    |  23     ----------------------------<  72     4.0     |  23     |  1.30   13 Jan 2023 10:30    141    |  103    |  29     ----------------------------<  92     3.4     |  27     |  1.80     Ca    8.1        14 Jan 2023 07:50  Ca    8.5        13 Jan 2023 10:30    TPro  6.1    /  Alb  2.1    /  TBili  1.3    /  DBili  x      /  AST  25     /  ALT  16     /  AlkPhos  137    14 Jan 2023 07:50  TPro  7.1    /  Alb  2.4    /  TBili  1.2    /  DBili  x      /  AST  28     /  ALT  19     /  AlkPhos  154    13 Jan 2023 10:30   LIVER FUNCTIONS - ( 14 Jan 2023 07:50 )  Alb: 2.1 g/dL / Pro: 6.1 g/dL / ALK PHOS: 137 U/L / ALT: 16 U/L / AST: 25 U/L / GGT: x           PT/INR - ( 13 Jan 2023 10:30 )   PT: 12.8 sec;   INR: 1.09 ratio         PTT - ( 13 Jan 2023 10:30 )  PTT:29.8 sec  12 Lead ECG:   Ventricular Rate 84 BPM    Atrial Rate 84 BPM    P-R Interval 136 ms    QRS Duration 80 ms    Q-T Interval 358 ms    QTC Calculation(Bazett) 423 ms    P Axis 31 degrees    R Axis 17 degrees    T Axis 9 degrees    Diagnosis Line Normal sinus rhythm  Nonspecific ST abnormality  Abnormal ECG  When compared with ECG of 28-DEC-2022 01:00,  aberrant conduction is no longer present  Confirmed by Clifford Fontanez MD ([id number]) on 1/13/2023 1:33:39 PM (01-13-23 @ 10:16)    
neuro cons dict  seen for fall/slurred speech  brain mri- subacute left mca infarct  echo  carotid doppler  asa/statin  PT  speech eval  management dw dr gonzalez

## 2023-01-14 NOTE — DISCHARGE NOTE PROVIDER - CARE PROVIDERS DIRECT ADDRESSES
,[email or web address],DirectAddress_Unknown ,[email or web address],DirectAddress_Unknown,DirectAddress_Unknown

## 2023-01-14 NOTE — CONSULT NOTE ADULT - SUBJECTIVE AND OBJECTIVE BOX
Patient is a 87y old  Male who presents with a chief complaint of abnormal outpatient MRI, stroke (2023 10:19)       HPI:  87M with PMH HTN, colon cancer (diagnosed 1 week ago, with possible liver mets) referred by PMD Dr. Avila for admission due to abnormal outpatient MRI showing concern for stroke. Wife and daughter present at bedside and provided information during interview. Patient reports that he is asymptomatic at this time. Of note, patient states that he has recent frequent falls (5x), most recently 3 weeks ago, and was seen in hospitals ED and CTH was negative for ICH at the time. Patient denies loss of consciousness but does confirm headstrikes and states the falls have been due to weakness. 3 months ago, patient's family report he had slurred speech, confusion, and fall; he was not evaluated after that incident. Patient also endorses recent lack of taste and weight loss of 25 lbs.  Was taking advil prior to arrival.  States he had 25lbs weight loss over 3 months.  Denies any problems urinating. No N/V/dizziness at present.  Has not seen nephrologist in the past.     PAST MEDICAL & SURGICAL HISTORY:  HTN (hypertension)      H/O hernia repair           FAMILY HISTORY:  FHx: Parkinson&#x27;s disease (Mother)    FHx: prostate cancer (Father)    NC    Social History:Non smoker    MEDICATIONS  (STANDING):  aspirin enteric coated 81 milliGRAM(s) Oral daily  atorvastatin 80 milliGRAM(s) Oral at bedtime  heparin   Injectable 5000 Unit(s) SubCutaneous every 8 hours    MEDICATIONS  (PRN):  acetaminophen     Tablet .. 650 milliGRAM(s) Oral every 6 hours PRN Temp greater or equal to 38C (100.4F), Mild Pain (1 - 3)  aluminum hydroxide/magnesium hydroxide/simethicone Suspension 30 milliLiter(s) Oral every 4 hours PRN Dyspepsia  melatonin 3 milliGRAM(s) Oral at bedtime PRN Insomnia  ondansetron Injectable 4 milliGRAM(s) IV Push every 8 hours PRN Nausea and/or Vomiting   Meds reviewed    Allergies    penicillin (Hives)    Intolerances         REVIEW OF SYSTEMS:    Review of Systems:   Constitutional: Denies fatigue  HEENT: Denies headaches and dizziness  Respiratory: denies SOB, cough, or wheezing  Cardiovascular: denies CP, palpitations  Gastrointestinal: Denies nausea, denies vomiting, diarrhea, constipation, abdominal pain, or bloody stools  Genitourinary: denies painful urination, increased frequency, urgency, or bloody urine  Skin: denies rashes or itching  Musculoskeletal: denies muscle aches, joint swelling  Neurologic: Denies generalized weakness, denies loss of sensation, numbness, or tingling      Vital Signs Last 24 Hrs  T(C): 36.6 (2023 08:40), Max: 36.8 (2023 18:00)  T(F): 97.9 (2023 08:40), Max: 98.3 (2023 05:57)  HR: 73 (2023 08:40) (73 - 98)  BP: 135/67 (2023 08:40) (120/68 - 135/67)  BP(mean): --  RR: 16 (2023 08:40) (16 - 18)  SpO2: 98% (2023 08:40) (98% - 99%)    Parameters below as of 2023 08:40  Patient On (Oxygen Delivery Method): room air      Daily     Daily     PHYSICAL EXAM:    GENERAL: NAD  HEAD:  Atraumatic, Normocephalic  EYES: EOMI, conjunctiva and sclera clear  ENMT: No Drainage from nares, No drainage from ears  NECK: Supple, neck  veins full  NERVOUS SYSTEM:  Awake and Alert  CHEST/LUNG: Clear to percussion bilaterally; No rales, rhonchi, wheezing, or rubs  HEART: Regular rate and rhythm; No murmurs, rubs, or gallops  ABDOMEN: Soft, Nontender, Nondistended; Bowel sounds present  EXTREMITIES: + Edema  SKIN: No rashes No obvious ecchymosis      LABS:                        10.9   8.45  )-----------( 264      ( 2023 07:50 )             31.0     -14    140  |  106  |  23  ----------------------------<  72  4.0   |  23  |  1.30    Ca    8.1<L>      2023 07:50    TPro  6.1  /  Alb  2.1<L>  /  TBili  1.3<H>  /  DBili  x   /  AST  25  /  ALT  16  /  AlkPhos  137<H>  -14    PT/INR - ( 2023 10:30 )   PT: 12.8 sec;   INR: 1.09 ratio         PTT - ( 2023 10:30 )  PTT:29.8 sec  Urinalysis Basic - ( 2023 15:00 )    Color: Yellow / Appearance: Clear / S.015 / pH: x  Gluc: x / Ketone: Small  / Bili: Negative / Urobili: 1   Blood: x / Protein: 30 mg/dL / Nitrite: Negative   Leuk Esterase: Negative / RBC: 3-5 /HPF / WBC 0-2   Sq Epi: x / Non Sq Epi: Few / Bacteria: Few              RADIOLOGY & ADDITIONAL TESTS:

## 2023-01-14 NOTE — DISCHARGE NOTE NURSING/CASE MANAGEMENT/SOCIAL WORK - PATIENT PORTAL LINK FT
You can access the FollowMyHealth Patient Portal offered by [facility] by registering at the following website: [email or web address]. By joining [employer]’s FollowMyHealth portal, you will also be able to view your health information using other applications (apps) compatible with our system.

## 2023-01-14 NOTE — PROGRESS NOTE ADULT - NS ATTEND AMEND GEN_ALL_CORE FT
I have personally seen and examined the patient in detail.  I have spoken to the provider regarding the assessment and plan of care.  I have made changes to the note accordingly.

## 2023-01-14 NOTE — CONSULT NOTE ADULT - ASSESSMENT
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

## 2023-01-14 NOTE — PROGRESS NOTE ADULT - ASSESSMENT
87M with PMHx HTN, newly dx colon cancer (with possible liver mets) referred by PMD for admission due to abnormal outpatient MRI showing concern for stroke    R/o CVA  - was sent by PMD  (Dr. Avila) to ER for MRI results concerning for stroke  - outpatient MRI (1/12/2023) acute small anterior L frontal lobe cortical infarct   - Neuro seen. CTH (12/27) neg   - Carotid doppler with No significant hemodynamic stenosis of either carotid artery.  - SR on tele  - BP stable and controlled with SBP BP: 135/67 (01-14-23 @ 08:40) (120/68 - 135/67)  - Permissive HTN defer to neurology.   - Continue aspirin and Lipitor     - TTE: 2020 EF 50%, f/u TTE   - Has chronic B/L LE edema, otherwise no significant signs of volume overload, no o2 requirement     - EKG: SR with nonspecific ST abnormality   - No evidence of any active ischemia     - D/c planning per primary team.   - Monitor and replete lytes, keep K>4, Mg>2.  - Will continue to follow.    Shemar Durán, MS FNP, AGACNP  Nurse Practitioner- Cardiology   Spectra #[phone]

## 2023-01-14 NOTE — DISCHARGE NOTE PROVIDER - CARE PROVIDER_API CALL
Clifford Fontanez)  Cardiovascular Disease  [address] [place] [address]  Phone: [phone]  Fax: [phone]  Follow Up Time: 2 weeks    Sigrid Rodney  NEUROLOGY  [address] [place] [address]  Phone: [phone]  Fax: [phone]  Follow Up Time: 2 weeks   Clifofrd Fontanez)  Cardiovascular Disease  [address] [place] [address]  Phone: [phone]  Fax: [phone]  Follow Up Time: 2 weeks    Sigrid Rodney  NEUROLOGY  [address] [place] [address]  Phone: [phone]  Fax: [phone]  Follow Up Time: 2 weeks    Kwasi Underwood ()  Internal Medicine  [address] [place] [address]  [place] [address]  Phone: [phone]  Fax: [phone]  Follow Up Time:

## 2023-01-14 NOTE — DISCHARGE NOTE PROVIDER - NSDCFUSCHEDAPPT_GEN_ALL_CORE_FT
[facility]  ULTRASND [address]   Scheduled Appointment: 01/14/2023    Maico Tavares  [facility]  NEUROLOGY [address] [facility]  Scheduled Appointment: 02/01/2023     Maico Tavares  [facility]  NEUROLOGY [address] [facility]  Scheduled Appointment: 02/01/2023

## 2023-01-14 NOTE — DISCHARGE NOTE PROVIDER - NSDCCPCAREPLAN_GEN_ALL_CORE_FT
PRINCIPAL DISCHARGE DIAGNOSIS  Diagnosis: Cerebrovascular accident (CVA)  Assessment and Plan of Treatment: You were diagnosed with a stroke by MRI. You were started on medications to prevent strokes from occurring including aspirin and atorvastatin. Presciptions were sent to your pharmacy for these medications.  Tests were ordered to assess for risk factors for strokes including a cardiac echo, lipid panel, Hgb A1c, and TSH. Follow up with your PCP to discuss the results of these tests.  Make the appropriate follow up appointments as follows:  PCP in 1-2 weeks  Cardiology in 1-2 weeks  Neurology in 1-2 weeks  Heme/Onc in 1-2 weeks  FOLLOW THE DISCHARGE INSTRUCTIONS INDICATING WHICH MEDICATIONS YOU SHOULD CONTINUE TAKING UPON DISCHARGE and which medications you should discontinue.      SECONDARY DISCHARGE DIAGNOSES  Diagnosis: Colon cancer  Assessment and Plan of Treatment: Continue your follow up care related to your colon cancer as you were prior to admission to the hospital. Please follow up with Dr. Christensen and Dr. Chung.    Diagnosis: MASON (acute kidney injury)  Assessment and Plan of Treatment: Please follow up with your PCP to have your creatinine and renal function followed up upon discharge.    Diagnosis: Anemia  Assessment and Plan of Treatment: You were noted to have a chronic anemia. Several studies were ordered to determine the cause of your stable chronic anemia. Please follow up with your PCP upon discharge to discuss these labs.     PRINCIPAL DISCHARGE DIAGNOSIS  Diagnosis: Cerebrovascular accident (CVA)  Assessment and Plan of Treatment: You were diagnosed with a stroke by MRI. You were started on medications to prevent strokes from occurring including aspirin and atorvastatin. Presciptions were sent to your pharmacy for these medications.  Tests were ordered to assess for risk factors for strokes including a cardiac echo, lipid panel, Hgb A1c, and TSH. Follow up with your PCP to discuss the results of these tests.  Make the appropriate follow up appointments as follows:  PCP in 1-2 weeks  Cardiology in 1-2 weeks  Neurology in 1-2 weeks  Heme/Onc in 1-2 weeks  FOLLOW THE DISCHARGE INSTRUCTIONS INDICATING WHICH MEDICATIONS YOU SHOULD CONTINUE TAKING UPON DISCHARGE and which medications you should discontinue.      SECONDARY DISCHARGE DIAGNOSES  Diagnosis: Colon cancer  Assessment and Plan of Treatment: Continue your follow up care related to your colon cancer as you were prior to admission to the hospital. Please follow up with Dr. Christensen and Dr. Chung.    Diagnosis: MASON (acute kidney injury)  Assessment and Plan of Treatment: Please follow up with your PCP to have your creatinine and renal function followed up upon discharge. please follow up with neprhology as outpatient    Diagnosis: Anemia  Assessment and Plan of Treatment: You were noted to have a chronic anemia. Several studies were ordered to determine the cause of your stable chronic anemia. Please follow up with your PCP upon discharge to discuss these labs.     PRINCIPAL DISCHARGE DIAGNOSIS  Diagnosis: Cerebrovascular accident (CVA)  Assessment and Plan of Treatment: You were diagnosed with a stroke by MRI. You were started on medications to prevent strokes from occurring including aspirin and atorvastatin. Presciptions were sent to your pharmacy for these medications.  Carotid and cardiac echos were performed and were unremarkable.  Tests were ordered to assess for risk factors for strokes including a lipid panel, Hgb A1c, and TSH. Follow up with your PCP to discuss the results of these tests.  Make the appropriate follow up appointments as follows:  PCP in 1-2 weeks  Cardiology in 1-2 weeks  Neurology in 1-2 weeks  Heme/Onc in 1-2 weeks  FOLLOW THE DISCHARGE INSTRUCTIONS INDICATING WHICH MEDICATIONS YOU SHOULD CONTINUE TAKING UPON DISCHARGE and which medications you should discontinue.      SECONDARY DISCHARGE DIAGNOSES  Diagnosis: Colon cancer  Assessment and Plan of Treatment: Continue your follow up care related to your colon cancer as you were prior to admission to the hospital. Please follow up with Dr. Christensen and Dr. Chung.    Diagnosis: MASON (acute kidney injury)  Assessment and Plan of Treatment: Please follow up with your PCP to have your creatinine and renal function followed up upon discharge. please follow up with neprhology as outpatient    Diagnosis: Anemia  Assessment and Plan of Treatment: You were noted to have a chronic anemia. Several studies were ordered to determine the cause of your stable chronic anemia. Please follow up with your PCP upon discharge to discuss these labs.

## 2023-01-14 NOTE — DISCHARGE NOTE PROVIDER - PROVIDER TOKENS
PROVIDER:[TOKEN:[[id number]],FOLLOWUP:[2 weeks]],PROVIDER:[TOKEN:[[id number]],FOLLOWUP:[2 weeks]] PROVIDER:[TOKEN:[[id number]],FOLLOWUP:[2 weeks]],PROVIDER:[TOKEN:[[id number]],FOLLOWUP:[2 weeks]],PROVIDER:[TOKEN:[[id number]]]

## 2023-01-14 NOTE — DISCHARGE NOTE PROVIDER - HOSPITAL COURSE
HPI:  87M with PMH HTN, colon cancer (diagnosed 1 week ago, with possible liver mets) referred by PMD Dr. Avila for admission due to abnormal outpatient MRI showing concern for stroke. Wife and daughter present at bedside and provided information during interview. Patient reports that he is asymptomatic at this time. Of note, patient states that he has recent frequent falls (5x), most recently 3 weeks ago, and was seen in \[facility]\"" ED and CTH was negative for ICH at the time. Patient denies loss of consciousness but does confirm headstrikes and states the falls have been due to weakness. 3 months ago, patient's family report he had slurred speech, confusion, and fall; he was not evaluated after that incident. Patient also endorses recent lack of taste and weight loss of 25 lbs.    In ED:  Vital signs: T 97.3F, , /71, RR 16, SpO2 100% on RA  Notable labs: Hgb 11.5, K 3.4, BUN 29, Cr 1.80, Albumin 2.4,   EKG: NSR @ 84bpm  Given: KCl 40mg x1   (13 Jan 2023 13:30)      ---  HOSPITAL COURSE:     87M with PMH HTN, colon cancer (with possible liver mets) referred by PMD for admission due to abnormal outpatient MRI showing concern for stroke, admitted for stroke workup. Neurology, cardiology, and nephrology were consulted. 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 Telemetry monitoring was negative for any cardiac events. Patient was started on ASA and atorvastatin. Carotid doppler performed - negative. TTE performed. Seen by PT - patient with home pt already coordinated. Patient's neurologic statis remained unchanged during hospital stay.    The patient was seen and examined on the day of discharge by the attending physician. Pt stable for discharge. Please see discharge note for additional information regarding the hospital course and the day of discharge.       T(C): 36.8 (01-14-23 @ 05:57), Max: 36.8 (01-13-23 @ 18:00)  HR: 87 (01-14-23 @ 05:57) (87 - 100)  BP: 127/62 (01-14-23 @ 05:57) (120/68 - 127/62)  RR: 17 (01-14-23 @ 05:57) (16 - 18)  SpO2: 98% (01-14-23 @ 05:57) (98% - 100%)    GENERAL: patient appears well, no acute distress  EYES: EOMI, sclera clear, no exudates  LUNGS: good air entry bilaterally, clear to auscultation, symmetric breath sounds, no wheezing or rhonchi appreciated  HEART: + B/l LE edema, chronic. soft S1/S2, regular rate and rhythm, no m/r/g  GASTROINTESTINAL: Soft NTND, no palpable masses  INTEGUMENT: warm skin, appears well perfused  MUSCULOSKELETAL: no clubbing or cyanosis, no obvious deformity  NEUROLOGIC: awake, alert, oriented x3, good muscle tone in 4 extremities, no obvious sensory deficits  PSYCHIATRIC: mood is good, affect is congruent, linear and logical thought process    ---  CONSULTANTS:     Neurology - Dr. Rodney  Cardiology - Dr. Fontanez  Nephrology - Yasmine    ---  TIME SPENT:  I, the attending physician, was physically present for the key portions of the evaluation and management (E/M) service provided. The total amount of time spent reviewing the hospital notes, laboratory values, imaging findings, assessing/counseling the patient, discussing with consultant physicians, social work, nursing staff was -- minutes    ---  Primary care provider was made aware of plan for discharge:      [  ] NO     [  ] YES   HPI:  87M with PMH HTN, colon cancer (diagnosed 1 week ago, with possible liver mets) referred by PMD Dr. Avila for admission due to abnormal outpatient MRI showing concern for stroke. Wife and daughter present at bedside and provided information during interview. Patient reports that he is asymptomatic at this time. Of note, patient states that he has recent frequent falls (5x), most recently 3 weeks ago, and was seen in [facility] ED and CTH was negative for ICH at the time. Patient denies loss of consciousness but does confirm headstrikes and states the falls have been due to weakness. 3 months ago, patient's family report he had slurred speech, confusion, and fall; he was not evaluated after that incident. Patient also endorses recent lack of taste and weight loss of 25 lbs.    In ED:  Vital signs: T 97.3F, , /71, RR 16, SpO2 100% on RA  Notable labs: Hgb 11.5, K 3.4, BUN 29, Cr 1.80, Albumin 2.4,   EKG: NSR @ 84bpm  Given: KCl 40mg x1   (13 Jan 2023 13:30)      ---  HOSPITAL COURSE:     87M with PMH HTN, colon cancer (with possible liver mets) referred by PMD for admission due to abnormal outpatient MRI showing concern for stroke, admitted for stroke workup. Neurology, cardiology, and nephrology were consulted. 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 Telemetry monitoring was negative for any cardiac events. Patient was started on ASA and atorvastatin. Carotid doppler performed - negative. TTE performed. Seen by PT - patient with home pt already coordinated. Patient's neurologic statis remained unchanged during hospital stay.    The patient was seen and examined on the day of discharge by the attending physician. Pt stable for discharge. Please see discharge note for additional information regarding the hospital course and the day of discharge.       T(C): 36.8 (01-14-23 @ 05:57), Max: 36.8 (01-13-23 @ 18:00)  HR: 87 (01-14-23 @ 05:57) (87 - 100)  BP: 127/62 (01-14-23 @ 05:57) (120/68 - 127/62)  RR: 17 (01-14-23 @ 05:57) (16 - 18)  SpO2: 98% (01-14-23 @ 05:57) (98% - 100%)    GENERAL: patient appears well, no acute distress  EYES: EOMI, sclera clear, no exudates  LUNGS: good air entry bilaterally, clear to auscultation, symmetric breath sounds, no wheezing or rhonchi appreciated  HEART: + B/l LE edema, chronic. soft S1/S2, regular rate and rhythm, no m/r/g  GASTROINTESTINAL: Soft NTND, no palpable masses  INTEGUMENT: warm skin, appears well perfused  MUSCULOSKELETAL: no clubbing or cyanosis, no obvious deformity 2+ pitting edema b/l LE  NEUROLOGIC: awake, alert, oriented x3, good muscle tone in 4 extremities, no obvious sensory deficits  PSYCHIATRIC: mood is good, affect is congruent, linear and logical thought process    ---  CONSULTANTS:     Neurology - Dr. Rodney  Cardiology - Dr. Fontanez  Nephrology - [facility]    ---  TIME SPENT:  I, the attending physician, was physically present for the key portions of the evaluation and management (E/M) service provided. The total amount of time spent reviewing the hospital notes, laboratory values, imaging findings, assessing/counseling the patient, discussing with consultant physicians, social work, nursing staff was -- minutes    ---  Primary care provider was made aware of plan for discharge:      [  ] NO     [  ] YES   HPI:  87M with PMH HTN, colon cancer (diagnosed 1 week ago, with possible liver mets) referred by PMD Dr. Avila for admission due to abnormal outpatient MRI showing concern for stroke. Wife and daughter present at bedside and provided information during interview. Patient reports that he is asymptomatic at this time. Of note, patient states that he has recent frequent falls (5x), most recently 3 weeks ago, and was seen in [facility] ED and CTH was negative for ICH at the time. Patient denies loss of consciousness but does confirm headstrikes and states the falls have been due to weakness. 3 months ago, patient's family report he had slurred speech, confusion, and fall; he was not evaluated after that incident. Patient also endorses recent lack of taste and weight loss of 25 lbs.    In ED:  Vital signs: T 97.3F, , /71, RR 16, SpO2 100% on RA  Notable labs: Hgb 11.5, K 3.4, BUN 29, Cr 1.80, Albumin 2.4,   EKG: NSR @ 84bpm  Given: KCl 40mg x1   (13 Jan 2023 13:30)      ---  HOSPITAL COURSE:     87M with PMH HTN, colon cancer (with possible liver mets) referred by PMD for admission due to abnormal outpatient MRI showing concern for stroke, admitted for stroke workup. Neurology, cardiology, and nephrology were consulted. 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 Telemetry monitoring was negative for any cardiac events. Patient was started on ASA and atorvastatin. Carotid doppler performed - negative. TTE performed revealing normal LV systolic function and LVEF of 60-65%, normal RV size and function, calcified aortic value with mild AS, without AI. Mild MR, Trace TR. Seen by PT - patient with home pt already coordinated. Patient's neurologic status remained unchanged during hospital stay.    The patient was seen and examined on the day of discharge by the attending physician. Pt stable for discharge. Please see discharge note for additional information regarding the hospital course and the day of discharge.       T(C): 36.8 (01-14-23 @ 05:57), Max: 36.8 (01-13-23 @ 18:00)  HR: 87 (01-14-23 @ 05:57) (87 - 100)  BP: 127/62 (01-14-23 @ 05:57) (120/68 - 127/62)  RR: 17 (01-14-23 @ 05:57) (16 - 18)  SpO2: 98% (01-14-23 @ 05:57) (98% - 100%)    GENERAL: patient appears well, no acute distress  EYES: EOMI, sclera clear, no exudates  LUNGS: good air entry bilaterally, clear to auscultation, symmetric breath sounds, no wheezing or rhonchi appreciated  HEART: + B/l LE edema, chronic. soft S1/S2, regular rate and rhythm, no m/r/g  GASTROINTESTINAL: Soft NTND, no palpable masses  INTEGUMENT: warm skin, appears well perfused  MUSCULOSKELETAL: no clubbing or cyanosis, no obvious deformity 2+ pitting edema b/l LE  NEUROLOGIC: awake, alert, oriented x3, good muscle tone in 4 extremities, no obvious sensory deficits  PSYCHIATRIC: mood is good, affect is congruent, linear and logical thought process    ---  CONSULTANTS:     Neurology - Dr. Rodney  Cardiology - Dr. Fontanez  [facility]    ---  TIME SPENT:  I, the attending physician, was physically present for the key portions of the evaluation and management (E/M) service provided. The total amount of time spent reviewing the hospital notes, laboratory values, imaging findings, assessing/counseling the patient, discussing with consultant physicians, social work, nursing staff was -- minutes    ---  Primary care provider was made aware of plan for discharge:      [  ] NO     [  ] YES

## 2023-01-14 NOTE — DISCHARGE NOTE PROVIDER - ATTENDING DISCHARGE PHYSICAL EXAMINATION:
T(C): 36.8 (01-14-23 @ 05:57), Max: 36.8 (01-13-23 @ 18:00)  HR: 87 (01-14-23 @ 05:57) (87 - 100)  BP: 127/62 (01-14-23 @ 05:57) (120/68 - 127/62)  RR: 17 (01-14-23 @ 05:57) (16 - 18)  SpO2: 98% (01-14-23 @ 05:57) (98% - 100%)    GENERAL: patient appears well, no acute distress  EYES: EOMI, sclera clear, no exudates  LUNGS: good air entry bilaterally, clear to auscultation, symmetric breath sounds, no wheezing or rhonchi appreciated  HEART: + B/l LE edema, chronic. soft S1/S2, regular rate and rhythm, no m/r/g  GASTROINTESTINAL: Soft NTND, no palpable masses  INTEGUMENT: warm skin, appears well perfused  MUSCULOSKELETAL: no clubbing or cyanosis, no obvious deformity 2+ pitting edema b/l LE  NEUROLOGIC: awake, alert, oriented x3, good muscle tone in 4 extremities, no obvious sensory deficits  PSYCHIATRIC: mood is good, affect is congruent, linear and logical thought process

## 2023-01-18 ENCOUNTER — APPOINTMENT (OUTPATIENT)
Dept: CARDIOLOGY | Facility: CLINIC | Age: 88
End: 2023-01-18

## 2023-01-18 ENCOUNTER — NON-APPOINTMENT (OUTPATIENT)
Age: 88
End: 2023-01-18

## 2023-01-19 ENCOUNTER — APPOINTMENT (OUTPATIENT)
Dept: CARDIOLOGY | Facility: CLINIC | Age: 88
End: 2023-01-19

## 2023-01-20 ENCOUNTER — NON-APPOINTMENT (OUTPATIENT)
Age: 88
End: 2023-01-20

## 2023-01-20 DIAGNOSIS — C18.9 MALIGNANT NEOPLASM OF COLON, UNSPECIFIED: ICD-10-CM

## 2023-01-23 DIAGNOSIS — I63.9 CEREBRAL INFARCTION, UNSPECIFIED: ICD-10-CM

## 2023-01-23 RX ORDER — ASPIRIN ENTERIC COATED TABLETS 81 MG 81 MG/1
81 TABLET, DELAYED RELEASE ORAL DAILY
Refills: 0 | Status: ACTIVE | COMMUNITY
Start: 2023-01-23

## 2023-01-23 RX ORDER — ATORVASTATIN CALCIUM 40 MG/1
40 TABLET, FILM COATED ORAL
Qty: 90 | Refills: 1 | Status: ACTIVE | COMMUNITY
Start: 2023-01-23 | End: 1900-01-01

## 2023-01-24 ENCOUNTER — APPOINTMENT (OUTPATIENT)
Dept: CARDIOLOGY | Facility: CLINIC | Age: 88
End: 2023-01-24

## 2023-01-31 ENCOUNTER — NON-APPOINTMENT (OUTPATIENT)
Age: 88
End: 2023-01-31

## 2023-02-01 ENCOUNTER — APPOINTMENT (OUTPATIENT)
Dept: NEUROLOGY | Facility: CLINIC | Age: 88
End: 2023-02-01
Payer: MEDICARE

## 2023-02-01 VITALS — SYSTOLIC BLOOD PRESSURE: 120 MMHG | DIASTOLIC BLOOD PRESSURE: 75 MMHG | HEART RATE: 91 BPM

## 2023-02-01 DIAGNOSIS — G20 PARKINSON'S DISEASE: ICD-10-CM

## 2023-02-01 DIAGNOSIS — I63.81 OTHER CEREBRAL INFARCTION DUE TO OCCLUSION OR STENOSIS OF SMALL ARTERY: ICD-10-CM

## 2023-02-01 PROBLEM — I10 ESSENTIAL (PRIMARY) HYPERTENSION: Chronic | Status: ACTIVE | Noted: 2023-01-13

## 2023-02-01 PROCEDURE — 99204 OFFICE O/P NEW MOD 45 MIN: CPT

## 2023-02-01 NOTE — ASSESSMENT
[FreeTextEntry1] : Mr. Kaplan is an 87-year-old with recent weight loss, gait difficulties and frequent falls.  Diagnostic imaging revealed findings worrisome for metastatic colon cancer.  Hepatic biopsy is pending.  He underwent a brain MRI to evaluate for the possibility of cerebral metastatic disease and was found to have an incidental acute left frontal cortical lacunar infarction.  This infarct appears to be a small vessel infarct and not embolic.  An echocardiogram was unremarkable.  He is being appropriately treated with aspirin and atorvastatin.  For completeness, I would recommend a prolonged heart rhythm recorder to exclude paroxysmal atrial fibrillation.\par \par He appears parkinsonian particularly his gait.  His MRI does not reveal significant findings to attribute this to vascular parkinsonism.  I suspect that he may indeed suffer from idiopathic Parkinson's disease.  Once his medical condition is stabilized, a dopamine uptake scan can be performed to confirm the clinical suspicion of Parkinson's disease.  Alternatively, a therapeutic trial of Sinemet can be considered.\par \par I suggested that he take all precautions to avoid falling.  I suggested close telephone and office follow-up.

## 2023-02-01 NOTE — PHYSICAL EXAM
[FreeTextEntry1] : Constitutional:  Patient was elderly and frail but in no acute distress. \par \par Head:  Normocephalic, atraumatic. Tympanic membranes were obscured by cerumen. \par \par Neck:  Supple with full range of motion. \par \par Cardiovascular:  Cardiac rhythm was regular without murmur. There were no carotid bruits. Peripheral pulses were full and symmetric.  There was 4+ pedal and pretibial edema.\par \par Respiratory:  Lungs were clear. \par \par Abdomen:  Soft and nontender. \par \par Spine:  Nontender. \par \par Skin:  There were no rashes. \par \par NEUROLOGICAL EXAMINATION:\par \par Mental Status: Patient was alert and oriented. Speech was fluent. There was no dysarthria. \par \par Cranial Nerves: \par \par II: Visual acuity was 20/20 in the right eye and 20/30-1 in the left eye with glasses and near card. Pupils were equal and reactive. Visual fields were full. Funduscopic examination was normal. \par \par III, IV, VI:  Eye movements were full without nystagmus. \par \par V: Facial sensation was intact. \par \par VII: Facial strength was normal.  There was no significant facial masking.\par \par VIII: Hearing was equal. \par \par IX, X: Palatal movement was normal. Phonation was normal. \par \par XI: Sternocleidomastoids and trapezii were normal. \par \par XII: Tongue was midline and movements normal. There was no lingual atrophy or fasciculations. \par \par Motor Examination: He was bradykinetic with relatively good strength throughout.  There was no tremor.\par \par Sensory Examination: Pinprick, vibration and joint position sense were intact. \par \par Reflexes: DTRs were 1 throughout except for the brachioradialis and ankles which are absent.\par \par Plantar Responses: Plantar responses were flexor. \par \par Coordination/Cerebellar Function: There was no dysmetria on finger to nose testing. \par \par Gait/Stance: He had mild difficulty rising from a seated position.  His posture was stooped.  Strides were short and arm swing the decreased.  Turns were decomposed.  He retropulsed on Romberg testing and could not tandem.\par

## 2023-02-01 NOTE — HISTORY OF PRESENT ILLNESS
[FreeTextEntry1] : Mr. Petr Kaplan is an 87-year-old right-handed gentleman who is referred for neurologic evaluation at your kind suggestion.  He was accompanied by his wife Madhuri and daughter Eulalia.\par \par Mr. Kaplan has been experiencing recent weight loss.  He is also developed shuffling gait and frequent falls.  After recent fall, he experienced abdominal pain which prompted CT of the abdomen.  That study revealed multiple hepatic masses and a probable right ascending colon lesion with adenopathy.  He is status post needle biopsy of his liver.  The result is pending.  He is scheduled to see Dr. Fabrizio Chung, an oncologist soon.\par \par He underwent an MRI of the brain without contrast on January 12, 2023 to exclude metastatic disease.  That study revealed atrophy and mild white matter changes.  There was an acute left frontal cortical lacunar infarction.  A carotid Doppler did not reveal significant stenosis.  A recent echocardiogram did not reveal a cardiac source of emboli.  EKG revealed sinus rhythm.  He was begun on aspirin and atorvastatin.\par \par He denies cognitive difficulties.  His family feels that he has recently been slurring his speech a bit.  He denies visual, swallowing, sensory or sphincteric difficulties.  His arms are weaker than his legs.  He requires a cane to ambulate.\par \par Past surgical history is notable for right inguinal herniorrhaphy.  He suffers from hypertension and recent anemia.  A diagnosis colon cancer is suspected.  There is no history of diabetes, hyperlipidemia, cardiac, pulmonary, renal, thyroid or prior cerebrovascular disease.  He has an allergy to penicillin.  Medications include atorvastatin 40 mg/day and baby aspirin.  He is a former smoker and social drinker.  He is a retired analyst and is .  Family history is notable for a mother with Parkinson's disease, a father with bone cancer and an 80-year-old brother who is well.

## 2023-02-01 NOTE — REVIEW OF SYSTEMS
[Recent Weight Loss (___ Lbs)] : recent [unfilled] ~Ulb weight loss [Difficulty Walking] : difficulty walking [Frequent Falls] : frequent falls [Negative] : Heme/Lymph

## 2023-02-01 NOTE — CONSULT LETTER
[Dear  ___] : Dear  [unfilled], [Consult Letter:] : I had the pleasure of evaluating your patient, [unfilled]. [Please see my note below.] : Please see my note below. [Consult Closing:] : Thank you very much for allowing me to participate in the care of this patient.  If you have any questions, please do not hesitate to contact me. [Sincerely,] : Sincerely, [FreeTextEntry3] : Maico Tavares MD\par  [DrRosalia  ___] : Dr. HENDRICKS [DrRosalia ___] : Dr. HENDRICKS

## 2023-02-06 ENCOUNTER — NON-APPOINTMENT (OUTPATIENT)
Age: 88
End: 2023-02-06

## 2023-02-10 NOTE — PHYSICAL THERAPY INITIAL EVALUATION ADULT - BED MOBILITY LIMITATIONS, REHAB EVAL
February 10, 2023      [facility]  [address] [place] [address]  Phone: [phone]  Fax: [phone]       Patient: Anette Villa   YOB: 1986  Date of Visit: 02/10/2023    To Whom It May Concern:    Rio Villa  was at [facility] on 02/10/2023. The patient may return to work/school on 2/10/2023 with no restrictions. If you have any questions or concerns, or if I can be of further assistance, please do not hesitate to contact me.    Sincerely,      Leisa Adkins PA-C     
decreased ability to use arms for pushing/pulling/decreased ability to use legs for bridging/pushing/impaired ability to control trunk for mobility

## 2023-03-01 ENCOUNTER — APPOINTMENT (OUTPATIENT)
Dept: GERIATRICS | Facility: CLINIC | Age: 88
End: 2023-03-01

## 2023-05-11 ENCOUNTER — APPOINTMENT (OUTPATIENT)
Dept: NEUROLOGY | Facility: CLINIC | Age: 88
End: 2023-05-11
